# Patient Record
Sex: FEMALE | Race: WHITE | NOT HISPANIC OR LATINO | Employment: UNEMPLOYED
[De-identification: names, ages, dates, MRNs, and addresses within clinical notes are randomized per-mention and may not be internally consistent; named-entity substitution may affect disease eponyms.]

---

## 2017-07-08 ENCOUNTER — HEALTH MAINTENANCE LETTER (OUTPATIENT)
Age: 62
End: 2017-07-08

## 2017-08-22 ENCOUNTER — OFFICE VISIT (OUTPATIENT)
Dept: OTOLARYNGOLOGY | Facility: CLINIC | Age: 62
End: 2017-08-22

## 2017-08-22 VITALS
DIASTOLIC BLOOD PRESSURE: 64 MMHG | SYSTOLIC BLOOD PRESSURE: 119 MMHG | BODY MASS INDEX: 21.83 KG/M2 | HEART RATE: 69 BPM | TEMPERATURE: 97.5 F | WEIGHT: 131 LBS | HEIGHT: 65 IN

## 2017-08-22 DIAGNOSIS — H61.21 IMPACTED CERUMEN OF RIGHT EAR: Primary | ICD-10-CM

## 2017-08-22 PROCEDURE — 69210 REMOVE IMPACTED EAR WAX UNI: CPT | Mod: RT | Performed by: OTOLARYNGOLOGY

## 2017-08-22 PROCEDURE — 99202 OFFICE O/P NEW SF 15 MIN: CPT | Mod: 25 | Performed by: OTOLARYNGOLOGY

## 2017-08-22 ASSESSMENT — PAIN SCALES - GENERAL: PAINLEVEL: NO PAIN (0)

## 2017-08-22 NOTE — PROGRESS NOTES
"    History of Present Illness - Mana Govea is a 62 year old female who presents with concerns regarding cerumen impaction. This was noted by her audiologist at [facility]. She tried debrox for 4 days, but this did not seem to help. She denies any trouble with hearing loss. She denies prior ear surgery.  She also notes some occasional sharp pain in her right jaw, as well as under the right jaw. She has some history of TMJ dysfunction.    Past Medical History -   Patient Active Problem List   Diagnosis     Hyperlipidemia LDL goal <130     History of pneumonia       Current Medications -   Current Outpatient Prescriptions:      NO ACTIVE MEDICATIONS, ., Disp: , Rfl:     Allergies - No Known Allergies    Social History -   Social History     Social History     Marital status:      Spouse name: N/A     Number of children: N/A     Years of education: N/A     Social History Main Topics     Smoking status: Never Smoker     Smokeless tobacco: Never Used     Alcohol use No     Drug use: No     Sexual activity: Not on file     Other Topics Concern     Parent/Sibling W/ Cabg, Mi Or Angioplasty Before 65f 55m? No     Social History Narrative     No narrative on file       Family History -   Family History   Problem Relation Age of Onset     Hypertension Mother      HEART DISEASE Father      valve;heart     Lipids Brother      Elevated chol.     CANCER Brother      lung       Review of Systems - As per HPI and PMHx, otherwise 7 system review of the head and neck negative. 10+ system review negative.    Physical Exam  /64 (BP Location: Right arm, Patient Position: Chair, Cuff Size: Adult Regular)  Pulse 69  Temp 97.5  F (36.4  C) (Oral)  Ht 1.651 m (5' 5\")  Wt 59.4 kg (131 lb)  BMI 21.8 kg/m2  General - The patient is well nourished and well developed, and appears to have good nutritional status.  Alert and oriented to person and place, answers questions and cooperates with examination appropriately.   Head " and Face - Normocephalic and atraumatic, with no gross asymmetry noted of the contour of the facial features.  The facial nerve is intact, with strong symmetric movements.  Voice and Breathing - The patient was breathing comfortably without the use of accessory muscles. There was no wheezing, stridor, or stertor.  The patients voice was clear and strong, and had appropriate pitch and quality.  Ears - Bilateral pinna and EACs with normal appearing overlying skin. Tympanic membrane intact with good mobility on pneumatic otoscopy bilaterally. Bony landmarks of the ossicular chain are normal. The tympanic membranes are normal in appearance. No retraction, perforation, or masses.  No fluid or purulence was seen in the external canal or the middle ear.   Eyes - Extraocular movements intact.  Sclera were not icteric or injected, conjunctiva were pink and moist.  Mouth - Examination of the oral cavity showed pink, healthy oral mucosa. No lesions or ulcerations noted.  The tongue was mobile and midline, and the dentition were in good condition.    Throat - The walls of the oropharynx were smooth, pink, moist, symmetric, and had no lesions or ulcerations.  The tonsillar pillars and soft palate were symmetric.  The uvula was midline on elevation.  Neck - Normal midline excursion of the laryngotracheal complex during swallowing.  Full range of motion on passive movement.  Palpation of the occipital, submental, submandibular, internal jugular chain, and supraclavicular nodes did not demonstrate any abnormal lymph nodes or masses.  The carotid pulse was palpable bilaterally.  Palpation of the thyroid was soft and smooth, with no nodules or goiter appreciated.  The trachea was mobile and midline.  Nose - External contour is symmetric, no gross deflection or scars.  Nasal mucosa is pink and moist with no abnormal mucus.  The septum was midline and non-obstructive, turbinates of normal size and position.  No polyps, masses, or  purulence noted on examination.    Procedure: Cerumen Disimpaction  Diagnosis: Cerumen Impaction    Procedure and Findings  Ears - On examination of the ears, I found that the  ears were impacted with dense cerumen obscuring visualization of the right TM.  Therefore, I positioned the patient and I used the binocular surgical microscope to assist in visualization of the affected ear(s). The entire plug was pulled from the canal with microforceps.  The tympanic membrane was intact without sign of perforation or middle ear effusion and minimal ear canal trauma.             Assessment - Mana Govea is a 62 year old female with right cerumen impaction. Once this is removed, her ear exam appears unremarkable. I advised she f/u with her audiologist for her audiogram at this point.       Dr. Keri Cristobal MD  Otolaryngology  [facility]

## 2017-08-22 NOTE — NURSING NOTE
"Initial /64 (BP Location: Right arm, Patient Position: Chair, Cuff Size: Adult Regular)  Pulse 69  Temp 97.5  F (36.4  C) (Oral)  Ht 1.651 m (5' 5\")  Wt 59.4 kg (131 lb)  BMI 21.8 kg/m2 Estimated body mass index is 21.8 kg/(m^2) as calculated from the following:    Height as of this encounter: 1.651 m (5' 5\").    Weight as of this encounter: 59.4 kg (131 lb). .    Imani Orosco CMA    "

## 2017-08-22 NOTE — MR AVS SNAPSHOT
"              After Visit Summary   2017    Mana Govea    MRN: [id number]           Patient Information     Date Of Birth          1955        Visit Information        Provider Department      2017 9:45 AM Keri Cristobal MD [facility]        Today's Diagnoses     Impacted cerumen of right ear    -  1      Care Instructions    Per Physician's instructions            Follow-ups after your visit        Who to contact     If you have questions or need follow up information about today's clinic visit or your schedule please contact [facility] directly at [phone].  Normal or non-critical lab and imaging results will be communicated to you by [employer], letter or phone within 4 business days after the clinic has received the results. If you do not hear from us within 7 days, please contact the clinic through [employer] or phone. If you have a critical or abnormal lab result, we will notify you by phone as soon as possible.  Submit refill requests through [employer] or call your pharmacy and they will forward the refill request to us. Please allow 3 business days for your refill to be completed.          Additional Information About Your Visit        MyChart Information     [employer] lets you send messages to your doctor, view your test results, renew your prescriptions, schedule appointments and more. To sign up, go to [email or web address] . Click on \"Log in\" on the left side of the screen, which will take you to the Welcome page. Then click on \"Sign up Now\" on the right side of the page.     You will be asked to enter the access code listed below, as well as some personal information. Please follow the directions to create your username and password.     Your access code is: [email or web address]  Expires: 2017 11:07 AM     Your access code will  in 90 days. If you need help or a new code, please call your [facility] or [phone].        Care EveryWhere ID     " "This is your Care EveryWhere ID. This could be used by other organizations to access your [place] medical records  [id number]        Your Vitals Were     Pulse Temperature Height BMI (Body Mass Index)          69 97.5  F (36.4  C) (Oral) 1.651 m (5' 5\") 21.8 kg/m2         Blood Pressure from Last 3 Encounters:   08/22/17 119/64   10/07/13 122/74   10/06/13 134/69    Weight from Last 3 Encounters:   08/22/17 59.4 kg (131 lb)   10/07/13 61.2 kg (135 lb)   10/05/13 61.2 kg (135 lb)              We Performed the Following     Remove Cerumen          Today's Medication Changes          These changes are accurate as of: 8/22/17 11:07 AM.  If you have any questions, ask your nurse or doctor.               Stop taking these medicines if you haven't already. Please contact your care team if you have questions.     HYDROcodone-acetaminophen 5-325 MG per tablet   Commonly known as:  NORCO   Stopped by:  Keri Cristobal MD                    Primary Care Provider Office Phone # Fax #    Eugene Mark Pappas [phone] [address] [address]        Equal Access to Services     APRIL NOVAK AH: Hadii judy moreirao Soiramali, waaxda luqadaha, qaybta kaalmada adeegyada, demetris young . So [facility] [phone].    ATENCIÓN: Si habla español, tiene a méndez disposición servicios gratuitos de asistencia lingüística. Llame al [phone].    We comply with applicable federal civil rights laws and [place] laws. We do not discriminate on the basis of race, color, national origin, age, disability sex, sexual orientation or gender identity.            Thank you!     Thank you for choosing [facility]  for your care. Our goal is always to provide you with excellent care. Hearing back from our patients is one way we can continue to improve our services. Please take a few minutes to complete the written survey that you may receive in the mail after your visit with us. " Thank you!             Your Updated Medication List - Protect others around you: Learn how to safely use, store and throw away your medicines at www.disposemymeds.org.          This list is accurate as of: 8/22/17 11:07 AM.  Always use your most recent med list.                   Brand Name Dispense Instructions for use Diagnosis    NO ACTIVE MEDICATIONS      .

## 2021-07-05 ENCOUNTER — OFFICE VISIT (OUTPATIENT)
Dept: FAMILY MEDICINE | Facility: CLINIC | Age: 66
End: 2021-07-05
Payer: COMMERCIAL

## 2021-07-05 VITALS
HEART RATE: 66 BPM | WEIGHT: 136 LBS | SYSTOLIC BLOOD PRESSURE: 116 MMHG | HEIGHT: 64 IN | DIASTOLIC BLOOD PRESSURE: 78 MMHG | TEMPERATURE: 98.5 F | OXYGEN SATURATION: 96 % | BODY MASS INDEX: 23.22 KG/M2

## 2021-07-05 DIAGNOSIS — Z12.11 SCREEN FOR COLON CANCER: ICD-10-CM

## 2021-07-05 DIAGNOSIS — Z13.6 CARDIOVASCULAR SCREENING; LDL GOAL LESS THAN 100: ICD-10-CM

## 2021-07-05 DIAGNOSIS — Z12.31 VISIT FOR SCREENING MAMMOGRAM: ICD-10-CM

## 2021-07-05 DIAGNOSIS — Z78.0 MENOPAUSE PRESENT: ICD-10-CM

## 2021-07-05 DIAGNOSIS — Z13.220 SCREENING FOR HYPERLIPIDEMIA: ICD-10-CM

## 2021-07-05 DIAGNOSIS — Z00.00 ENCOUNTER FOR MEDICARE ANNUAL WELLNESS EXAM: Primary | ICD-10-CM

## 2021-07-05 PROCEDURE — 99387 INIT PM E/M NEW PAT 65+ YRS: CPT | Performed by: NURSE PRACTITIONER

## 2021-07-05 ASSESSMENT — MIFFLIN-ST. JEOR: SCORE: 1132.92

## 2021-07-05 NOTE — NURSING NOTE
"Chief Complaint   Patient presents with     Medicare Visit       Initial /78   Pulse 66   Temp 98.5  F (36.9  C) (Tympanic)   Ht 1.619 m (5' 3.75\")   Wt 61.7 kg (136 lb)   SpO2 96%   BMI 23.53 kg/m   Estimated body mass index is 23.53 kg/m  as calculated from the following:    Height as of this encounter: 1.619 m (5' 3.75\").    Weight as of this encounter: 61.7 kg (136 lb).    Patient presents to the clinic using No DME    Health Maintenance that is potentially due pending provider review:  Mammogram and Colonoscopy/FIT    Pt declines to have mammogram. Pt notified of due colonoscopy.           "

## 2021-07-05 NOTE — PROGRESS NOTES
"  SUBJECTIVE:   Mana Govea is a 66 year old female who presents for Preventive Visit.      Patient has been advised of split billing requirements and indicates understanding: Yes  Are you in the first 12 months of your Medicare Part B coverage?  No    Physical Health:    In general, how would you rate your overall physical health? excellent    Outside of work, how many days during the week do you exercise? none    Outside of work, approximately how many minutes a day do you exercise?not applicable    If you drink alcohol do you typically have >3 drinks per day or >7 drinks per week? No    Do you usually eat at least 4 servings of fruit and vegetables a day, include whole grains & fiber and avoid regularly eating high fat or \"junk\" foods? NO    Do you have any problems taking medications regularly?  No    Do you have any side effects from medications? not applicable    Needs assistance for the following daily activities: no assistance needed    Which of the following safety concerns are present in your home?  lack of grab bars in the bathroom     Hearing impairment: No    In the past 6 months, have you been bothered by leaking of urine? no    Mental Health:    In general, how would you rate your overall mental or emotional health? excellent  PHQ-2 Score:      Do you feel safe in your environment? Yes    Have you ever done Advance Care Planning? (For example, a Health Directive, POLST, or a discussion with a medical provider or your loved ones about your wishes): No, advance care planning information given to patient to review.  Patient plans to discuss their wishes with loved ones or provider.      Additional concerns to address?  No    Fall risk:  Fallen 2 or more times in the past year?: No  Any fall with injury in the past year?: No    Cognitive Screenin) Repeat 3 items (Leader, Season, Table)    2) Clock draw: NORMAL  3) 3 item recall: Recalls 3 objects  Results: 3 items recalled: COGNITIVE " IMPAIRMENT LESS LIKELY    Mini-CogTM Copyright PRUDENCIO Strickland. Licensed by the author for use in [facility]; reprinted with permission (timothy@.[facility]). All rights reserved.      Do you have sleep apnea, excessive snoring or daytime drowsiness?: no    -------------------------------------    Reviewed and updated as needed this visit by clinical staff  Tobacco  Allergies  Meds   Med Hx  Surg Hx  Fam Hx  Soc Hx        Reviewed and updated as needed this visit by Provider                Social History     Tobacco Use     Smoking status: Never Smoker     Smokeless tobacco: Never Used   Substance Use Topics     Alcohol use: No                           Current providers sharing in care for this patient include:   Patient Care Team:  Samantha Clement APRN CNP as PCP - General (Family Medicine)    The following health maintenance items are reviewed in Epic and correct as of today:  Health Maintenance   Topic Date Due     ANNUAL REVIEW OF HM ORDERS  Never done     ADVANCE CARE PLANNING  Never done     COLORECTAL CANCER SCREENING  Never done     COVID-19 Vaccine (1) Never done     HEPATITIS C SCREENING  Never done     DTAP/TDAP/TD IMMUNIZATION (1 - Tdap) 06/25/1979     ZOSTER IMMUNIZATION (1 of 2) Never done     MAMMO SCREENING  08/11/2006     LIPID  08/29/2010     DEXA  06/04/2019     FALL RISK ASSESSMENT  Never done     Pneumococcal Vaccine: 65+ Years (1 of 1 - PPSV23) Never done     INFLUENZA VACCINE (1) 09/01/2021     MEDICARE ANNUAL WELLNESS VISIT  07/05/2022     PHQ-2  Completed     IPV IMMUNIZATION  Aged Out     MENINGITIS IMMUNIZATION  Aged Out     HEPATITIS B IMMUNIZATION  Aged Out     BP Readings from Last 3 Encounters:   07/05/21 116/78   08/22/17 119/64   10/07/13 122/74    Wt Readings from Last 3 Encounters:   07/05/21 61.7 kg (136 lb)   08/22/17 59.4 kg (131 lb)   10/07/13 61.2 kg (135 lb)                  Pneumonia Vaccine:Adults age 65+ who received Pneumovax (PPSV23) at 65 years or older: Should  "be given PCV13 > 1 year after their most recent PPSV23  Mammogram Screening: Mammogram Screening: Recommended mammography every 1-2 years with patient discussion and risk factor consideration    ROS:  Constitutional, HEENT, cardiovascular, pulmonary, GI, , musculoskeletal, neuro, skin, endocrine and psych systems are negative, except as otherwise noted.    OBJECTIVE:   /78   Pulse 66   Temp 98.5  F (36.9  C) (Tympanic)   Ht 1.619 m (5' 3.75\")   Wt 61.7 kg (136 lb)   SpO2 96%   BMI 23.53 kg/m   Estimated body mass index is 23.53 kg/m  as calculated from the following:    Height as of this encounter: 1.619 m (5' 3.75\").    Weight as of this encounter: 61.7 kg (136 lb).  EXAM:   GENERAL APPEARANCE: healthy, alert and no distress  EYES: Eyes grossly normal to inspection, PERRL and conjunctivae and sclerae normal  HENT: ear canals and TM's normal, nose and mouth without ulcers or lesions, oropharynx clear and oral mucous membranes moist  NECK: no adenopathy, no asymmetry, masses, or scars and thyroid normal to palpation  RESP: lungs clear to auscultation - no rales, rhonchi or wheezes  BREAST: normal without masses, tenderness or nipple discharge and no palpable axillary masses or adenopathy  CV: regular rate and rhythm, normal S1 S2, no S3 or S4, no murmur, click or rub, no peripheral edema and peripheral pulses strong  ABDOMEN: soft, nontender, no hepatosplenomegaly, no masses and bowel sounds normal  MS: no musculoskeletal defects are noted and gait is age appropriate without ataxia  SKIN: no suspicious lesions or rashes  NEURO: Normal strength and tone, sensory exam grossly normal, mentation intact and speech normal  PSYCH: mentation appears normal and affect normal/bright    Diagnostic Test Results:  Labs reviewed in Epic    ASSESSMENT / PLAN:   1. Encounter for Medicare annual wellness exam      2. Screen for colon cancer    - GASTROENTEROLOGY ADULT REF PROCEDURE ONLY; Future    3. Visit for screening " "mammogram  Patient given number to schedule    4. Screening for hyperlipidemia    - Lipid panel reflex to direct LDL Fasting; Future    5. CARDIOVASCULAR SCREENING; LDL GOAL LESS THAN 100    - **Basic metabolic panel FUTURE anytime; Future    6. Menopause present    - DEXA HIP/PELVIS/SPINE - Future; Future    Patient has been advised of split billing requirements and indicates understanding: Yes    COUNSELING:  Reviewed preventive health counseling, as reflected in patient instructions       Regular exercise       Healthy diet/nutrition       Immunizations    Declined: Pneumococcal, Td, TDAP and Zoster due to Concerns about side effects/safety               Osteoporosis prevention/bone health       Colon cancer screening    Estimated body mass index is 23.53 kg/m  as calculated from the following:    Height as of this encounter: 1.619 m (5' 3.75\").    Weight as of this encounter: 61.7 kg (136 lb).        She reports that she has never smoked. She has never used smokeless tobacco.    Appropriate preventive services were discussed with this patient, including applicable screening as appropriate for cardiovascular disease, diabetes, osteopenia/osteoporosis, and glaucoma.  As appropriate for age/gender, discussed screening for colorectal cancer, prostate cancer, breast cancer, and cervical cancer. Checklist reviewing preventive services available has been given to the patient.    Reviewed patients plan of care and provided an AVS. The Basic Care Plan (routine screening as documented in Health Maintenance) for Mana meets the Care Plan requirement. This Care Plan has been established and reviewed with the Patient.    Counseling Resources:  ATP IV Guidelines  Pooled Cohorts Equation Calculator  Breast Cancer Risk Calculator  BRCA-Related Cancer Risk Assessment: FHS-7 Tool  FRAX Risk Assessment  ICSI Preventive Guidelines  Dietary Guidelines for Americans, 2010  USDA's MyPlate  ASA Prophylaxis  Lung CA " Screening    BARNEY Collier [facility]

## 2021-07-15 ENCOUNTER — HOSPITAL ENCOUNTER (OUTPATIENT)
Dept: BONE DENSITY | Facility: CLINIC | Age: 66
Discharge: HOME | End: 2021-07-15
Attending: NURSE PRACTITIONER | Admitting: NURSE PRACTITIONER
Payer: COMMERCIAL

## 2021-07-15 ENCOUNTER — LAB (OUTPATIENT)
Dept: LAB | Facility: CLINIC | Age: 66
End: 2021-07-15
Payer: COMMERCIAL

## 2021-07-15 DIAGNOSIS — Z13.220 SCREENING FOR HYPERLIPIDEMIA: ICD-10-CM

## 2021-07-15 DIAGNOSIS — Z78.0 MENOPAUSE PRESENT: ICD-10-CM

## 2021-07-15 DIAGNOSIS — Z13.6 CARDIOVASCULAR SCREENING; LDL GOAL LESS THAN 100: ICD-10-CM

## 2021-07-15 LAB
ANION GAP SERPL CALCULATED.3IONS-SCNC: 4 MMOL/L (ref 3–14)
BUN SERPL-MCNC: 13 MG/DL (ref 7–30)
CALCIUM SERPL-MCNC: 9.5 MG/DL (ref 8.5–10.1)
CHLORIDE BLD-SCNC: 106 MMOL/L (ref 94–109)
CHOLEST SERPL-MCNC: 313 MG/DL
CO2 SERPL-SCNC: 29 MMOL/L (ref 20–32)
CREAT SERPL-MCNC: 0.74 MG/DL (ref 0.52–1.04)
FASTING STATUS PATIENT QL REPORTED: YES
GFR SERPL CREATININE-BSD FRML MDRD: 84 ML/MIN/1.73M2
GLUCOSE BLD-MCNC: 90 MG/DL (ref 70–99)
HDLC SERPL-MCNC: 65 MG/DL
LDLC SERPL CALC-MCNC: 222 MG/DL
NONHDLC SERPL-MCNC: 248 MG/DL
POTASSIUM BLD-SCNC: 4.3 MMOL/L (ref 3.4–5.3)
SODIUM SERPL-SCNC: 139 MMOL/L (ref 133–144)
TRIGL SERPL-MCNC: 130 MG/DL

## 2021-07-15 PROCEDURE — 80061 LIPID PANEL: CPT

## 2021-07-15 PROCEDURE — 80048 BASIC METABOLIC PNL TOTAL CA: CPT

## 2021-07-15 PROCEDURE — 77080 DXA BONE DENSITY AXIAL: CPT

## 2021-07-15 PROCEDURE — 36415 COLL VENOUS BLD VENIPUNCTURE: CPT

## 2021-07-19 DIAGNOSIS — M81.0 OSTEOPOROSIS WITHOUT CURRENT PATHOLOGICAL FRACTURE, UNSPECIFIED OSTEOPOROSIS TYPE: Primary | ICD-10-CM

## 2021-07-19 RX ORDER — ALENDRONATE SODIUM 70 MG/1
70 TABLET ORAL
Qty: 12 TABLET | Refills: 3 | Status: SHIPPED | OUTPATIENT
Start: 2021-07-19 | End: 2022-04-21

## 2021-07-22 ENCOUNTER — VIRTUAL VISIT (OUTPATIENT)
Dept: FAMILY MEDICINE | Facility: CLINIC | Age: 66
End: 2021-07-22
Payer: COMMERCIAL

## 2021-07-22 VITALS — WEIGHT: 135 LBS | BODY MASS INDEX: 23.35 KG/M2

## 2021-07-22 DIAGNOSIS — M81.0 OSTEOPOROSIS WITHOUT CURRENT PATHOLOGICAL FRACTURE, UNSPECIFIED OSTEOPOROSIS TYPE: ICD-10-CM

## 2021-07-22 DIAGNOSIS — E78.5 HYPERLIPIDEMIA LDL GOAL <100: Primary | ICD-10-CM

## 2021-07-22 PROCEDURE — 99214 OFFICE O/P EST MOD 30 MIN: CPT | Mod: 95 | Performed by: NURSE PRACTITIONER

## 2021-07-22 RX ORDER — ATORVASTATIN CALCIUM 20 MG/1
20 TABLET, FILM COATED ORAL DAILY
Qty: 30 TABLET | Refills: 3 | Status: SHIPPED | OUTPATIENT
Start: 2021-07-22 | End: 2021-12-17

## 2021-07-22 RX ORDER — ALENDRONATE SODIUM 70 MG/1
70 TABLET ORAL
Qty: 12 TABLET | Refills: 3 | Status: SHIPPED | OUTPATIENT
Start: 2021-07-22 | End: 2022-04-21

## 2021-07-22 NOTE — PROGRESS NOTES
Mana is a 66 year old who is being evaluated via a billable telephone visit.      What phone number would you like to be contacted at? [phone]  How would you like to obtain your AVS? MyChart    Assessment & Plan     Hyperlipidemia LDL goal <100  New-   Dicussed diet and exercise  - start atorvastatin (LIPITOR) 20 MG tablet; Take 1 tablet (20 mg) by mouth daily  - recheck in 3 months Lipid panel reflex to direct LDL Fasting; Future  - ALT; Future    Osteoporosis without current pathological fracture, unspecified osteoporosis type  New- repeat DEXA in 2 yrs.   Discussed Weight bearing exercises/ prevent falls   - start alendronate (FOSAMAX) 70 MG tablet; Take 1 tablet (70 mg) by mouth every 7 days        No follow-ups on file.    BARNEY Collier [facility]    Subjective   Mana is a 66 year old who presents for the following health issues    HPI     Hyperlipidemia Follow-Up      Are you regularly taking any medication or supplement to lower your cholesterol?   No    Are you having muscle aches or other side effects that you think could be caused by your cholesterol lowering medication?  No      Osteoporosis: new- noted on DEXA scan. No history of fractures.         Review of Systems   Constitutional, HEENT, cardiovascular, pulmonary, GI, , musculoskeletal, neuro, skin, endocrine and psych systems are negative, except as otherwise noted.      Objective           Vitals:  No vitals were obtained today due to virtual visit.    Physical Exam   healthy, alert and no distress  PSYCH: Alert and oriented times 3; coherent speech, normal   rate and volume, able to articulate logical thoughts, able   to abstract reason, no tangential thoughts, no hallucinations   or delusions  Her affect is normal  RESP: No cough, no audible wheezing, able to talk in full sentences  Remainder of exam unable to be completed due to telephone visits            Phone call duration: 13 minutes

## 2021-08-16 ENCOUNTER — HOSPITAL ENCOUNTER (EMERGENCY)
Facility: CLINIC | Age: 66
Discharge: HOME | End: 2021-08-16
Attending: NURSE PRACTITIONER | Admitting: NURSE PRACTITIONER
Payer: COMMERCIAL

## 2021-08-16 VITALS — SYSTOLIC BLOOD PRESSURE: 122 MMHG | DIASTOLIC BLOOD PRESSURE: 74 MMHG | RESPIRATION RATE: 16 BRPM

## 2021-08-16 DIAGNOSIS — S61.209A FINGERTIP AVULSION, INITIAL ENCOUNTER: ICD-10-CM

## 2021-08-16 PROCEDURE — G0463 HOSPITAL OUTPT CLINIC VISIT: HCPCS | Performed by: NURSE PRACTITIONER

## 2021-08-16 PROCEDURE — 99212 OFFICE O/P EST SF 10 MIN: CPT | Performed by: NURSE PRACTITIONER

## 2021-08-16 NOTE — DISCHARGE INSTRUCTIONS
Leave the dressing in place until tomorrow.  Then change dressing twice a day. Leave the gauze that is placed on the tip of the finger fall off on its own.  Recheck for increased bleeding, redness, or drainage.

## 2021-08-16 NOTE — ED TRIAGE NOTES
Right pinky laceration.  Patient was slicing cucumbers with a knife and sliced finger.  Last Tdap was 2003.  Patient does not want booster.

## 2021-08-17 NOTE — ED PROVIDER NOTES
History     Chief Complaint   Patient presents with     Laceration     HPI  Mana Govea is a 66 year old female who presents to urgent care for evaluation of laceration to her right little finger.  Patient was using a vegetable slicer to cut cucumbers and accidentally sliced the tip of her finger.  Tetanus is not up-to-date, and patient is refusing update of her tetanus shot today.    Allergies:  No Known Allergies    Problem List:    Patient Active Problem List    Diagnosis Date Noted     Hyperlipidemia LDL goal <130 09/13/2012     Priority: Medium     History of pneumonia 09/13/2012     Priority: Medium        Past Medical History:    Past Medical History:   Diagnosis Date     Other and unspecified hyperlipidemia        Past Surgical History:    No past surgical history on file.    Family History:    Family History   Problem Relation Age of Onset     Hypertension Mother      Heart Disease Father         valve;heart     Lipids Brother         Elevated chol.     Cancer Brother         lung       Social History:  Marital Status:   [2]  Social History     Tobacco Use     Smoking status: Never Smoker     Smokeless tobacco: Never Used   Substance Use Topics     Alcohol use: No     Drug use: No        Medications:    alendronate (FOSAMAX) 70 MG tablet  alendronate (FOSAMAX) 70 MG tablet  atorvastatin (LIPITOR) 20 MG tablet  NO ACTIVE MEDICATIONS          Review of Systems  As mentioned above in the history present illness. All other systems were reviewed and are negative.    Physical Exam   BP: 122/74  Pulse: (P) 73  Resp: 16  SpO2: (P) 99 %      Physical Exam  Appearance: Alert, oriented, no acute distress.  Right fifth finger distal tip 3.5 cm avulsion.  No disruption to the nail.  Scant amount of blood oozing from the wound.         ED Course        Procedures            Wound dressed with Surgicel and tube gauze.    Assessments & Plan (with Medical Decision Making)   Leave the dressing in place until  tomorrow.  Then change dressing twice a day. Leave the gauze that is placed on the tip of the finger fall off on its own.  Recheck for increased bleeding, redness, or drainage.    I have reviewed the nursing notes.    I have reviewed the findings, diagnosis, plan and need for follow up with the patient.      Discharge Medication List as of 8/16/2021  4:32 PM          Final diagnoses:   Fingertip avulsion, initial encounter       8/16/2021   [facility] EMERGENCY DEPT     Keerthi Luo APRN CNP  08/16/21 2043

## 2021-10-13 ENCOUNTER — LAB (OUTPATIENT)
Dept: LAB | Facility: CLINIC | Age: 66
End: 2021-10-13
Payer: COMMERCIAL

## 2021-10-13 DIAGNOSIS — E78.5 HYPERLIPIDEMIA LDL GOAL <100: ICD-10-CM

## 2021-10-13 LAB
ALT SERPL W P-5'-P-CCNC: 24 U/L (ref 0–50)
CHOLEST SERPL-MCNC: 202 MG/DL
FASTING STATUS PATIENT QL REPORTED: YES
HDLC SERPL-MCNC: 67 MG/DL
LDLC SERPL CALC-MCNC: 116 MG/DL
NONHDLC SERPL-MCNC: 135 MG/DL
TRIGL SERPL-MCNC: 96 MG/DL

## 2021-10-13 PROCEDURE — 84460 ALANINE AMINO (ALT) (SGPT): CPT

## 2021-10-13 PROCEDURE — 80061 LIPID PANEL: CPT

## 2021-10-13 PROCEDURE — 36415 COLL VENOUS BLD VENIPUNCTURE: CPT

## 2021-12-17 DIAGNOSIS — E78.5 HYPERLIPIDEMIA LDL GOAL <100: ICD-10-CM

## 2021-12-17 RX ORDER — ATORVASTATIN CALCIUM 20 MG/1
TABLET, FILM COATED ORAL
Qty: 90 TABLET | Refills: 0 | Status: SHIPPED | OUTPATIENT
Start: 2021-12-17 | End: 2022-04-21

## 2022-04-20 ENCOUNTER — TELEPHONE (OUTPATIENT)
Dept: FAMILY MEDICINE | Facility: CLINIC | Age: 67
End: 2022-04-20
Payer: COMMERCIAL

## 2022-04-21 ENCOUNTER — VIRTUAL VISIT (OUTPATIENT)
Dept: FAMILY MEDICINE | Facility: CLINIC | Age: 67
End: 2022-04-21
Payer: COMMERCIAL

## 2022-04-21 DIAGNOSIS — Z12.11 SCREEN FOR COLON CANCER: ICD-10-CM

## 2022-04-21 DIAGNOSIS — E78.5 HYPERLIPIDEMIA LDL GOAL <100: ICD-10-CM

## 2022-04-21 DIAGNOSIS — Z12.31 VISIT FOR SCREENING MAMMOGRAM: ICD-10-CM

## 2022-04-21 PROCEDURE — 99213 OFFICE O/P EST LOW 20 MIN: CPT | Mod: 95 | Performed by: NURSE PRACTITIONER

## 2022-04-21 RX ORDER — ATORVASTATIN CALCIUM 20 MG/1
TABLET, FILM COATED ORAL
Qty: 90 TABLET | Refills: 3 | Status: SHIPPED | OUTPATIENT
Start: 2022-04-21 | End: 2022-04-21

## 2022-04-21 NOTE — TELEPHONE ENCOUNTER
Form completed, signed, and faxed to [facility] at [phone]. Copy of form sent to scan and copy placed in basket.     Patient asks to be notified when form was faxed.  She is not active in My Chart. [phone]

## 2022-04-21 NOTE — TELEPHONE ENCOUNTER
[facility] medical history form started and routed to Dr. Mckeon for review and signature.

## 2022-04-21 NOTE — PROGRESS NOTES
Mana is a 66 year old who is being evaluated via a billable telephone visit.      What phone number would you like to be contacted at? [phone]  How would you like to obtain your AVS? Mail a copy    Assessment & Plan     Hyperlipidemia LDL goal <100  Well controlled.  - atorvastatin (LIPITOR) 20 MG tablet; TAKE 1 TABLET ONCE DAILY    Screen for colon cancer  - Adult Gastro Ref - Procedure Only; Future    Visit for screening mammogram  - MA SCREENING DIGITAL BILAT - Future  (s+30); Future      The risks, benefits and treatment options of prescribed medications or other treatments have been discussed with the patient. The patient verbalized their understanding and should call or follow up if no improvement or if they develop further problems.    [facility]            Vanda Adair is a 66 year old who presents for the following health issues     HPI     Chief Complaint   Patient presents with     Refill Request     atorvastatin     Patient Request     Wants to donate plasma  Also has questions about form she dropped off-  (is with Dr. Mckeon for review and signature.)         Refill lipitor  Hyperlipidemia Follow-Up  Last lipid profile and ALT was 10/13/21      Are you regularly taking any medication or supplement to lower your cholesterol?   Yes- atorvastatin    Are you having muscle aches or other side effects that you think could be caused by your cholesterol lowering medication?  No     Recent Labs   Lab Test 10/13/21  0815 07/15/21  0901   CHOL 202* 313*   HDL 67 65   * 222*   TRIG 96 130           How many servings of fruits and vegetables do you eat daily?  2-3    On average, how many sweetened beverages do you drink each day (Examples: soda, juice, sweet tea, etc.  Do NOT count diet or artificially sweetened beverages)?   0    How many days per week do you exercise enough to make your heart beat faster? 3 or less    How many minutes a day  "do you exercise enough to make your heart beat faster? 20 - 29    How many days per week do you miss taking your medication? 0        Review of Systems   Constitutional, HEENT, cardiovascular, pulmonary, gi and gu systems are negative, except as otherwise noted.      Objective    Vitals - Patient Reported  Weight (Patient Reported): 59 kg (130 lb)  Height (Patient Reported): 162.6 cm (5' 4\")  BMI (Based on Pt Reported Ht/Wt): 22.31  Pain Score: No Pain (0)      Vitals:  No vitals were obtained today due to virtual visit.    Physical Exam     PSYCH: Alert and oriented times 3; coherent speech, normal   rate and volume, able to articulate logical thoughts, able   to abstract reason, no tangential thoughts, no hallucinations   or delusions    RESP: No cough, no audible wheezing, able to talk in full sentences  Remainder of exam unable to be completed due to telephone visits                Phone call duration: 10 minutes  "

## 2022-09-14 ENCOUNTER — TELEPHONE (OUTPATIENT)
Dept: FAMILY MEDICINE | Facility: CLINIC | Age: 67
End: 2022-09-14

## 2022-09-14 NOTE — LETTER
September 14, 2022      Mana Govea  [address] [facility] [id number]      Your healthcare team cares about your health. To provide you with the best care,   we have reviewed your chart and based on our findings, we see that you are due to:     - BREAST CANCER SCREENING:  Schedule Annual Mammogram. [facility] scheduling number - [phone] or schedule in MyChart (self referall)  - COLON CANCER SCREENING:  Call or mychart the clinic to schedule your colonoscopy or schedule/ your FIT Test, or Cologuard test    If you have already completed these items, please contact the clinic via phone or   [email or web address] so your care team can review and update your records. Thank you for   choosing [facility] for your healthcare needs. For any questions,   concerns, or to schedule an appointment please contact the clinic.       Healthy Regards,      Your [facility] Care Team

## 2022-09-14 NOTE — TELEPHONE ENCOUNTER
Patient Quality Outreach    Patient is due for the following:   Colon Cancer Screening  Breast Cancer Screening - Mammogram    Next Steps:   Schedule a Annual Wellness Visit  Schedule mammogram  Type of outreach:    Sent letter.      Questions for provider review:    None     Renee Barriga, [facility]

## 2022-11-28 ENCOUNTER — OFFICE VISIT (OUTPATIENT)
Dept: FAMILY MEDICINE | Facility: CLINIC | Age: 67
End: 2022-11-28
Payer: COMMERCIAL

## 2022-11-28 VITALS
WEIGHT: 131.9 LBS | HEIGHT: 64 IN | TEMPERATURE: 98.3 F | OXYGEN SATURATION: 97 % | SYSTOLIC BLOOD PRESSURE: 114 MMHG | HEART RATE: 68 BPM | DIASTOLIC BLOOD PRESSURE: 68 MMHG | RESPIRATION RATE: 16 BRPM | BODY MASS INDEX: 22.52 KG/M2

## 2022-11-28 DIAGNOSIS — M81.0 AGE-RELATED OSTEOPOROSIS WITHOUT CURRENT PATHOLOGICAL FRACTURE: ICD-10-CM

## 2022-11-28 DIAGNOSIS — Z00.00 ENCOUNTER FOR MEDICARE ANNUAL WELLNESS EXAM: Primary | ICD-10-CM

## 2022-11-28 DIAGNOSIS — N64.4 BREAST TENDERNESS IN FEMALE: ICD-10-CM

## 2022-11-28 DIAGNOSIS — Z11.59 NEED FOR HEPATITIS C SCREENING TEST: ICD-10-CM

## 2022-11-28 LAB
ANION GAP SERPL CALCULATED.3IONS-SCNC: 6 MMOL/L (ref 7–15)
BUN SERPL-MCNC: 11.8 MG/DL (ref 8–23)
CALCIUM SERPL-MCNC: 10.1 MG/DL (ref 8.8–10.2)
CHLORIDE SERPL-SCNC: 103 MMOL/L (ref 98–107)
CHOLEST SERPL-MCNC: 189 MG/DL
CREAT SERPL-MCNC: 0.67 MG/DL (ref 0.51–0.95)
DEPRECATED CALCIDIOL+CALCIFEROL SERPL-MC: 45 UG/L (ref 20–75)
DEPRECATED HCO3 PLAS-SCNC: 30 MMOL/L (ref 22–29)
GFR SERPL CREATININE-BSD FRML MDRD: >90 ML/MIN/1.73M2
GLUCOSE SERPL-MCNC: 86 MG/DL (ref 70–99)
HCV AB SERPL QL IA: NONREACTIVE
HDLC SERPL-MCNC: 64 MG/DL
LDLC SERPL CALC-MCNC: 99 MG/DL
NONHDLC SERPL-MCNC: 125 MG/DL
POTASSIUM SERPL-SCNC: 4.4 MMOL/L (ref 3.4–5.3)
SODIUM SERPL-SCNC: 139 MMOL/L (ref 136–145)
TRIGL SERPL-MCNC: 128 MG/DL

## 2022-11-28 PROCEDURE — 86803 HEPATITIS C AB TEST: CPT | Performed by: STUDENT IN AN ORGANIZED HEALTH CARE EDUCATION/TRAINING PROGRAM

## 2022-11-28 PROCEDURE — 99213 OFFICE O/P EST LOW 20 MIN: CPT | Performed by: STUDENT IN AN ORGANIZED HEALTH CARE EDUCATION/TRAINING PROGRAM

## 2022-11-28 PROCEDURE — 80061 LIPID PANEL: CPT | Performed by: STUDENT IN AN ORGANIZED HEALTH CARE EDUCATION/TRAINING PROGRAM

## 2022-11-28 PROCEDURE — 82306 VITAMIN D 25 HYDROXY: CPT | Performed by: STUDENT IN AN ORGANIZED HEALTH CARE EDUCATION/TRAINING PROGRAM

## 2022-11-28 PROCEDURE — 80048 BASIC METABOLIC PNL TOTAL CA: CPT | Performed by: STUDENT IN AN ORGANIZED HEALTH CARE EDUCATION/TRAINING PROGRAM

## 2022-11-28 PROCEDURE — 36415 COLL VENOUS BLD VENIPUNCTURE: CPT | Performed by: STUDENT IN AN ORGANIZED HEALTH CARE EDUCATION/TRAINING PROGRAM

## 2022-11-28 ASSESSMENT — ENCOUNTER SYMPTOMS
SORE THROAT: 0
HEADACHES: 0
HEARTBURN: 0
CHILLS: 0
ABDOMINAL PAIN: 0
DYSURIA: 0
HEMATURIA: 0
FREQUENCY: 0
MYALGIAS: 0
EYE PAIN: 0
PARESTHESIAS: 0
SHORTNESS OF BREATH: 0
BREAST MASS: 0
ARTHRALGIAS: 0
PALPITATIONS: 0
DIARRHEA: 0
FEVER: 0
NAUSEA: 0
WEAKNESS: 0
CONSTIPATION: 0
COUGH: 0
NERVOUS/ANXIOUS: 0
HEMATOCHEZIA: 0
JOINT SWELLING: 0
DIZZINESS: 0

## 2022-11-28 ASSESSMENT — PAIN SCALES - GENERAL: PAINLEVEL: NO PAIN (0)

## 2022-11-28 ASSESSMENT — ACTIVITIES OF DAILY LIVING (ADL): CURRENT_FUNCTION: NO ASSISTANCE NEEDED

## 2022-11-28 NOTE — LETTER
November 30, 2022      Mana Govea  [address] [facility] [id number]        Dear ,    We are writing to inform you of your test results.      It was a pleasure speaking with you. I have received and reviewed your lab results, and have the following recommendations:     Congrats! All your labs are within normal limits, no further or additional workup is necessary. Please complete your breast ultrasound when possible. The results will be available to me and you via [employer] if you would like to set that up.     Resulted Orders   Vitamin D Deficiency   Result Value Ref Range    Vitamin D, Total (25-Hydroxy) 45 20 - 75 ug/L    Narrative    Season, race, dietary intake, and treatment affect the concentration of 25-hydroxy-Vitamin D. Values may decrease during winter months and increase during summer months. Values 20-29 ug/L may indicate Vitamin D insufficiency and values <20 ug/L may indicate Vitamin D deficiency.    Vitamin D determination is routinely performed by an immunoassay specific for 25 hydroxyvitamin D3.  If an individual is on vitamin D2(ergocalciferol) supplementation, please specify 25 OH vitamin D2 and D3 level determination by LCMSMS test VITD23.     Hepatitis C Screen Reflex to HCV RNA Quant and Genotype   Result Value Ref Range    Hepatitis C Antibody Nonreactive Nonreactive    Narrative    Assay performance characteristics have not been established for newborns, infants, and children.   Lipid panel reflex to direct LDL Fasting   Result Value Ref Range    Cholesterol 189 <200 mg/dL    Triglycerides 128 <150 mg/dL    Direct Measure HDL 64 >=50 mg/dL    LDL Cholesterol Calculated 99 <=100 mg/dL    Non HDL Cholesterol 125 <130 mg/dL    Narrative    Cholesterol  Desirable:  <200 mg/dL    Triglycerides  Normal:  Less than 150 mg/dL  Borderline High:  150-199 mg/dL  High:  200-499 mg/dL  Very High:  Greater than or equal to 500 mg/dL    Direct Measure HDL  Female:  Greater than or  equal to 50 mg/dL   Male:  Greater than or equal to 40 mg/dL    LDL Cholesterol  Desirable:  <100mg/dL  Above Desirable:  100-129 mg/dL   Borderline High:  130-159 mg/dL   High:  160-189 mg/dL   Very High:  >= 190 mg/dL    Non HDL Cholesterol  Desirable:  130 mg/dL  Above Desirable:  130-159 mg/dL  Borderline High:  160-189 mg/dL  High:  190-219 mg/dL  Very High:  Greater than or equal to 220 mg/dL   Basic metabolic panel  (Ca, Cl, CO2, Creat, Gluc, K, Na, BUN)   Result Value Ref Range    Sodium 139 136 - 145 mmol/L    Potassium 4.4 3.4 - 5.3 mmol/L    Chloride 103 98 - 107 mmol/L    Carbon Dioxide (CO2) 30 (H) 22 - 29 mmol/L    Anion Gap 6 (L) 7 - 15 mmol/L    Urea Nitrogen 11.8 8.0 - 23.0 mg/dL    Creatinine 0.67 0.51 - 0.95 mg/dL    Calcium 10.1 8.8 - 10.2 mg/dL    Glucose 86 70 - 99 mg/dL    GFR Estimate >90 >60 mL/min/1.73m2      Comment:      Effective December 21, 2021 eGFRcr in adults is calculated using the 2021 CKD-EPI creatinine equation which includes age and gender ( et al., NEJM, DOI: 10.1056/JPDHkp5472181)       If you have any questions or concerns, please call the clinic at the number listed above.       Sincerely,      Fang Otto MD

## 2022-11-28 NOTE — PATIENT INSTRUCTIONS
Hello! It was a pleasure seeing you today. Just some things we discussed at today's visit:     Routine Healthcare    Please go to [facility] for blood work   Results of blood work will be sent to me and you can also see the results on mychart   Weight bearing exercises are good options for osteoperosis if you are not interested in the medication treatment   Please call [phone] to schedule an appointment for your     Sincerely,     Fang Otto MD          Patient Education   Personalized Prevention Plan  You are due for the preventive services outlined below.  Your care team is available to assist you in scheduling these services.  If you have already completed any of these items, please share that information with your care team to update in your medical record.  Health Maintenance Due   Topic Date Due    COVID-19 Vaccine (1) Never done    Colorectal Cancer Screening  Never done    Hepatitis C Screening  Never done    Diptheria Tetanus Pertussis (DTAP/TDAP/TD) Vaccine (1 - Tdap) 06/12/2003    Zoster (Shingles) Vaccine (1 of 2) Never done    Mammogram  08/11/2006    Pneumococcal Vaccine (1 - PCV) Never done    Annual Wellness Visit  07/05/2022    ANNUAL REVIEW OF HM ORDERS  07/05/2022    Flu Vaccine (1) Never done       Exercise for a Healthier Heart  You may wonder how you can improve the health of your heart. If you re thinking about exercise, you re on the right track. You don t need to become an athlete. But you do need a certain amount of brisk exercise to help strengthen your heart. If you have been diagnosed with a heart condition, your healthcare provider may advise exercise to help stabilize your condition. To help make exercise a habit, choose safe, fun activities.      Exercise with a friend. When activity is fun, you're more likely to stick with it.   Before you start  Check with your healthcare provider before starting an exercise program. This is especially important if you have not been active for  a while. It's also important if you have a long-term (chronic) health problem such as heart disease, diabetes, or obesity. Or if you are at high risk for having these problems.   Why exercise?  Exercising regularly offers many healthy rewards. It can help you do all of the following:   Improve your blood cholesterol level to help prevent further heart trouble  Lower your blood pressure to help prevent a stroke or heart attack  Control diabetes, or reduce your risk of getting this disease  Improve your heart and lung function  Reach and stay at a healthy weight  Make your muscles stronger so you can stay active  Prevent falls and fractures by slowing the loss of bone mass (osteoporosis)  Manage stress better  Reduce your blood pressure  Improve your sense of self and your body image  Exercise tips    Ease into your routine. Set small goals. Then build on them. If you are not sure what your activity level should be, talk with your healthcare provider first before starting an exercise routine.  Exercise on most days. Aim for a total of 150 minutes (2 hours and 30 minutes) or more of moderate-intensity aerobic activity each week. Or 75 minutes (1 hour and 15 minutes) or more of vigorous-intensity aerobic activity each week. Or try for a combination of both. Moderate activity means that you breathe heavier and your heart rate increases but you can still talk. Think about doing 40 minutes of moderate exercise, 3 to 4 times a week. For best results, activity should last for about 40 minutes to lower blood pressure and cholesterol. It's OK to work up to the 40-minute period over time. Examples of moderate-intensity activity are walking 1 mile in 15 minutes. Or doing 30 to 45 minutes of yard work.  Step up your daily activity level.  Along with your exercise program, try being more active the whole day. Walk instead of drive. Or park further away so that you take more steps each day. Do more household tasks or yard work. You  may not be able to meet the advised mount of physical activity. But doing some moderate- or vigorous-intensity aerobic activity can help reduce your risk for heart disease. Your healthcare provider can help you figure out what is best for you.  Choose 1 or more activities you enjoy.  Walking is one of the easiest things you can do. You can also try swimming, riding a bike, dancing, or taking an exercise class.    When to call your healthcare provider  Call your healthcare provider if you have any of these:   Chest pain or feel dizzy or lightheaded  Burning, tightness, pressure, or heaviness in your chest, neck, shoulders, back, or arms  Abnormal shortness of breath  More joint or muscle pain  A very fast or irregular heartbeat (palpitations)  [employer] last reviewed this educational content on 7/1/2019 2000-2021 [employer]. All rights reserved. This information is not intended as a substitute for professional medical care. Always follow your healthcare professional's instructions.          Understanding USDA MyPlate  The USDA has guidelines to help you make healthy food choices. These are called MyPlate. MyPlate shows the food groups that make up healthy meals using the image of a place setting. Before you eat, think about the healthiest choices for what to put on your plate or in your cup or bowl. To learn more about building a healthy plate, visit www.choosemyplate.gov.    The food groups  Fruits. Any fruit or 100% fruit juice counts as part of the Fruit Group. Fruits may be fresh, canned, frozen, or dried, and may be whole, cut-up, or pureed. Make 1/2 of your plate fruits and vegetables.  Vegetables. Any vegetable or 100% vegetable juice counts as a member of the Vegetable Group. Vegetables may be fresh, frozen, canned, or dried. They can be served raw or cooked and may be whole, cut-up, or mashed. Make 1/2 of your plate fruits and vegetables.  Grains. All foods made from grains are part of the  Grains Group. These include wheat, rice, oats, cornmeal, and barley. Grains are often used to make foods such as bread, pasta, oatmeal, cereal, tortillas, and grits. Grains should be no more than 1/4 of your plate. At least half of your grains should be whole grains.  Protein. This group includes meat, poultry, seafood, beans and peas, eggs, processed soy products (such as tofu), nuts (including nut butters), and seeds. Make protein choices no more than 1/4 of your plate. Meat and poultry choices should be lean or low fat.  Dairy. The Dairy Group includes all fluid milk products and foods made from milk that contain calcium, such as yogurt and cheese. (Foods that have little calcium, such as cream, butter, and cream cheese, are not part of this group.) Most dairy choices should be low-fat or fat-free.  Oils. Oils aren't a food group, but they do contain essential nutrients. However it's important to watch your intake of oils. These are fats that are liquid at room temperature. They include canola, corn, olive, soybean, vegetable, and sunflower oil. Foods that are mainly oil include mayonnaise, certain salad dressings, and soft margarines. You likely already get your daily oil allowance from the foods you eat.  Things to limit  Eating healthy also means limiting these things in your diet:     Salt (sodium). Many processed foods have a lot of sodium. To keep sodium intake down, eat fresh vegetables, meats, poultry, and seafood when possible. Purchase low-sodium, reduced-sodium, or no-salt-added food products at the store. And don't add salt to your meals at home. Instead, season them with herbs and spices such as dill, oregano, cumin, and paprika. Or try adding flavor with lemon or lime zest and juice.  Saturated fat. Saturated fats are most often found in animal products such as beef, pork, and chicken. They are often solid at room temperature, such as butter. To reduce your saturated fat intake, choose leaner cuts  of meat and poultry. And try healthier cooking methods such as grilling, broiling, roasting, or baking. For a simple lower-fat swap, use plain nonfat yogurt instead of mayonnaise when making potato salad or macaroni salad.  Added sugars. These are sugars added to foods. They are in foods such as ice cream, candy, soda, fruit drinks, sports drinks, energy drinks, cookies, pastries, jams, and syrups. Cut down on added sugars by sharing sweet treats with a family member or friend. You can also choose fruit for dessert, and drink water or other unsweetened beverages.     [employer] last reviewed this educational content on 6/1/2020 2000-2021 [employer]. All rights reserved. This information is not intended as a substitute for professional medical care. Always follow your healthcare professional's instructions.

## 2022-11-28 NOTE — PROGRESS NOTES
"SUBJECTIVE:   Mana is a 67 year old who presents for Preventive Visit.    Patient has been advised of split billing requirements and indicates understanding: Yes  Are you in the first 12 months of your Medicare coverage?  No    Healthy Habits:     In general, how would you rate your overall health?  Excellent    Frequency of exercise:  1 day/week    Duration of exercise:  15-30 minutes    Do you usually eat at least 4 servings of fruit and vegetables a day, include whole grains    & fiber and avoid regularly eating high fat or \"junk\" foods?  No    Taking medications regularly:  Yes    Barriers to taking medications:  None    Medication side effects:  None    Ability to successfully perform activities of daily living:  No assistance needed    Home Safety:  No safety concerns identified    Hearing Impairment:  No hearing concerns    In the past 6 months, have you been bothered by leaking of urine?  No    In general, how would you rate your overall mental or emotional health?  Good      PHQ-2 Total Score: 0    Additional concerns today:  No      Have you ever done Advance Care Planning? (For example, a Health Directive, POLST, or a discussion with a medical provider or your loved ones about your wishes): No, advance care planning information given to patient to review.  Advanced care planning was discussed at today's visit.     Fall risk  Fallen 2 or more times in the past year?: No  Any fall with injury in the past year?: No    Cognitive Screening   1) Repeat 3 items (Leader, Season, Table)    2) Clock draw: NORMAL  3) 3 item recall: Recalls 3 objects  Results: NORMAL clock, 1-2 items recalled: COGNITIVE IMPAIRMENT LESS LIKELY    Mini-CogTM Copyright PRUDENCIO Strickland. Licensed by the author for use in [facility]; reprinted with permission (timothy@.[facility]). All rights reserved.      Do you have sleep apnea, excessive snoring or daytime drowsiness?: no    Reviewed and updated as needed this visit by clinical " staff   Tobacco  Allergies  Meds              Reviewed and updated as needed this visit by Provider                 Social History     Tobacco Use     Smoking status: Never     Smokeless tobacco: Never   Substance Use Topics     Alcohol use: No     If you drink alcohol do you typically have >3 drinks per day or >7 drinks per week? No    Alcohol Use 11/28/2022   Prescreen: >3 drinks/day or >7 drinks/week? No         Current providers sharing in care for this patient include:   Patient Care Team:  No Ref-Primary, Physician as PCP - Raya Cerda APRN CNP as Assigned PCP    The following health maintenance items are reviewed in Epic and correct as of today:  Health Maintenance   Topic Date Due     COVID-19 Vaccine (1) Never done     COLORECTAL CANCER SCREENING  Never done     HEPATITIS C SCREENING  Never done     DTAP/TDAP/TD IMMUNIZATION (1 - Tdap) 06/12/2003     ZOSTER IMMUNIZATION (1 of 2) Never done     MAMMO SCREENING  08/11/2006     Pneumococcal Vaccine: 65+ Years (1 - PCV) Never done     MEDICARE ANNUAL WELLNESS VISIT  07/05/2022     ANNUAL REVIEW OF HM ORDERS  07/05/2022     INFLUENZA VACCINE (1) Never done     FALL RISK ASSESSMENT  11/28/2023     ADVANCE CARE PLANNING  07/05/2026     LIPID  10/13/2026     DEXA  07/15/2036     PHQ-2 (once per calendar year)  Completed     IPV IMMUNIZATION  Aged Out     MENINGITIS IMMUNIZATION  Aged Out     Lab work is in process    FHS-7:   Breast CA Risk Assessment (FHS-7) 11/28/2022   Did any of your first-degree relatives have breast or ovarian cancer? Yes   Did any of your relatives have bilateral breast cancer? No   Did any man in your family have breast cancer? No   Did any woman in your family have breast and ovarian cancer? Yes   Did any woman in your family have breast cancer before age 50 y? No   Do you have 2 or more relatives with breast and/or ovarian cancer? No   Do you have 2 or more relatives with breast and/or bowel cancer? No     Mammogram  "Screening: Recommended mammography every 1-2 years with patient discussion and risk factor consideration  Pertinent mammograms are reviewed under the imaging tab.    Review of Systems   Constitutional: Negative for chills and fever.   HENT: Negative for congestion, ear pain, hearing loss and sore throat.    Eyes: Negative for pain and visual disturbance.   Respiratory: Negative for cough and shortness of breath.    Cardiovascular: Negative for chest pain, palpitations and peripheral edema.   Gastrointestinal: Negative for abdominal pain, constipation, diarrhea, heartburn, hematochezia and nausea.   Breasts:  Negative for tenderness, breast mass and discharge.   Genitourinary: Negative for dysuria, frequency, genital sores, hematuria, pelvic pain, urgency and vaginal bleeding.   Musculoskeletal: Negative for arthralgias, joint swelling and myalgias.   Skin: Negative for rash.   Neurological: Negative for dizziness, weakness, headaches and paresthesias.   Psychiatric/Behavioral: Negative for mood changes. The patient is not nervous/anxious.        OBJECTIVE:   /68 (BP Location: Left arm, Patient Position: Sitting, Cuff Size: Adult Regular)   Pulse 68   Temp 98.3  F (36.8  C) (Tympanic)   Resp 16   Ht 1.618 m (5' 3.7\")   Wt 59.8 kg (131 lb 14.4 oz)   LMP  (LMP Unknown)   SpO2 97%   Breastfeeding No   BMI 22.85 kg/m   Estimated body mass index is 22.85 kg/m  as calculated from the following:    Height as of this encounter: 1.618 m (5' 3.7\").    Weight as of this encounter: 59.8 kg (131 lb 14.4 oz).  Physical Exam  Constitutional:       General: She is not in acute distress.  HENT:      Head: Normocephalic and atraumatic.      Right Ear: External ear normal.      Left Ear: External ear normal.      Mouth/Throat:      Pharynx: No oropharyngeal exudate.   Eyes:      Extraocular Movements: Extraocular movements intact.   Cardiovascular:      Rate and Rhythm: Normal rate and regular rhythm.      Heart sounds: " "Normal heart sounds.   Pulmonary:      Effort: Pulmonary effort is normal. No respiratory distress.      Breath sounds: Normal breath sounds. No wheezing or rhonchi.   Chest:          Comments: During breast exam, patient had a muscle spasm in the back   Abdominal:      Palpations: Abdomen is soft. There is no mass.      Tenderness: There is no abdominal tenderness.   Musculoskeletal:         General: No deformity. Normal range of motion.      Cervical back: Normal range of motion and neck supple.   Skin:     General: Skin is warm.      Findings: No rash.   Neurological:      General: No focal deficit present.      Mental Status: She is alert and oriented to person, place, and time.   Psychiatric:         Mood and Affect: Mood normal.         ASSESSMENT / PLAN:   1. Encounter for Medicare annual wellness exam  2. Need for hepatitis C screening test  - REVIEW OF HEALTH MAINTENANCE PROTOCOL ORDERS  - Lipid panel reflex to direct LDL Fasting  - Basic metabolic panel  (Ca, Cl, CO2, Creat, Gluc, K, Na, BUN)  - Hepatitis C Screen Reflex to HCV RNA Quant and Genotype    3. Age-related osteoporosis without current pathological fracture  > patient declines treatment with alendronate, states she took it for 1 month, then was told she couldn't donate plasma in the past and since then feels like it isn't a safe medication   - went over side effects of bisphosphonate treatment, patient reports she is still not interested in initiating treatment at this time   - discussed weight bearing exercises   - patient is taking over the counter \"bone health\" supplement, but unsure of name or contents of the medication   - Vitamin D Deficiency     4. Breast tenderness in female  > patient declines mammogram for routine breast cancer screening because of radiation exposure, informed patient that radiation exposure is weighed with risks and benefits and that benefits often outweigh the risk with breast cancer screening   > patient's mother " was diagnosed with breast cancer when she was over the age of 65   > tenderness to palpation was noted in both right and left upper inner quadrants   - US Breast Right Limited 1-3 Quadrants; Future      Patient has been advised of split billing requirements and indicates understanding: Yes      COUNSELING:  Reviewed preventive health counseling, as reflected in patient instructions        She reports that she has never smoked. She has never used smokeless tobacco.      Appropriate preventive services were discussed with this patient, including applicable screening as appropriate for cardiovascular disease, diabetes, osteopenia/osteoporosis, and glaucoma.  As appropriate for age/gender, discussed screening for colorectal cancer, prostate cancer, breast cancer, and cervical cancer. Checklist reviewing preventive services available has been given to the patient.    Reviewed patients plan of care and provided an AVS. The Basic Care Plan (routine screening as documented in Health Maintenance) for Mana meets the Care Plan requirement. This Care Plan has been established and reviewed with the Patient.          GUERRERO FRIAS MD  [facility]    Identified Health Risks:

## 2022-11-30 NOTE — RESULT ENCOUNTER NOTE
Carolina Govea,     It was a pleasure speaking with you. I have received and reviewed your lab results, and have the following recommendations:     Congrats! All your labs are within normal limits, no further or additional workup is necessary. Please complete your breast ultrasound when possible. The results will be available to me and you via [employer] if you would like to set that up.     Sincerely,     Fang Otto MD

## 2022-12-26 ENCOUNTER — HOSPITAL ENCOUNTER (OUTPATIENT)
Dept: MAMMOGRAPHY | Facility: CLINIC | Age: 67
Discharge: HOME | End: 2022-12-26
Attending: STUDENT IN AN ORGANIZED HEALTH CARE EDUCATION/TRAINING PROGRAM
Payer: COMMERCIAL

## 2022-12-26 DIAGNOSIS — N64.4 BREAST TENDERNESS IN FEMALE: ICD-10-CM

## 2022-12-26 PROCEDURE — 77062 BREAST TOMOSYNTHESIS BI: CPT

## 2022-12-26 NOTE — LETTER
December 26, 2022      Mana Govea  [address] [facility] [address]        Dear ,    We are writing to inform you of your test results.      Your mammogram results are within normal limits you do not need another mammogram until next year.       Resulted Orders   MA Diagnostic Bilateral w/Cruz    Narrative    MA DIAGNOSTIC BILATERAL WITH TOMOSYNTHESIS  12/26/2022 10:39 AM    HISTORY:  Bilateral medial/sternal chest pain during annual breast  examination. Currently, no breast symptoms.    COMPARISON:  None available.    BREAST DENSITY: Heterogeneously dense.    FINDINGS:  No spiculated mass, asymmetry, architectural distortion, or  suspicious appearing microcalcifications.      Impression    IMPRESSION: BI-RADS CATEGORY: 1 -  Negative    RECOMMENDED FOLLOW-UP: Annual Mammography.    ALICIA RAE MD         SYSTEM ID:  [id number]       If you have any questions or concerns, please call the clinic at the number listed above.       Sincerely,      Fang Otto MD

## 2022-12-26 NOTE — RESULT ENCOUNTER NOTE
Congratulations!    Your mammogram results are within normal limits you do not need another mammogram until next year.     Sincerely,     Fang Otto MD

## 2022-12-26 NOTE — LETTER
Mana Govea  [address] [facility] [address]        December 26, 2022    Date of Exam:     Dear Mana:    Thank you for your recent visit.     Breast Imaging Result: We are pleased to inform you that the results of your recent breast imaging show no evidence of malignancy (cancer).    Breast Density: Your breast imaging shows that you have dense breast tissue. This means you have slightly more risk of getting breast cancer. It also means your breast imaging will be harder to read. While it's harder to read, it's still important to do breast imaging. In fact, yearly breast imaging is even more important for anyone at higher risk. You may need to do more testing if you have enough risk.    If you are having any problems such as a lump or pain in your breast, please discuss this with your health care team if you haven't already. You may need more testing.    As you know, it's important to find cancer early. Not all cancers are found through breast imaging, but it's the most accurate method for finding cancer early. A complete check should include breast imaging, physical exams, and breast self-exams. The American College of Radiology and the Society of Breast Imaging advises that yearly breast imaging should start at age 40.    A report of your breast imaging results was sent to: Fang Otto    Your breast imaging will become part of your medical file here at [facility] for at least 10 years. You are responsible for telling any new health care team or breast imaging site of the date and place of this exam.     We appreciate the opportunity to participate in your health care.    Sincerely,  Domenico Gallagher MD  [facility]

## 2023-01-05 ENCOUNTER — TELEPHONE (OUTPATIENT)
Dept: FAMILY MEDICINE | Facility: CLINIC | Age: 68
End: 2023-01-05

## 2023-01-05 NOTE — TELEPHONE ENCOUNTER
Patient Quality Outreach    Patient is due for the following:   Colon Cancer Screening  Physical Annual Wellness Visit    Next Steps:   Schedule a Annual Wellness Visit    Type of outreach:    Sent letter.    Next Steps:  Reach out within 90 days via Letter.    Max number of attempts reached: No. Will try again in 90 days if patient still on fail list.    Questions for provider review:    None     MIRIAM Morgan LPN

## 2023-01-05 NOTE — LETTER
January 5, 2023      Mana J Jeferson  [address] [facility] [address]    Your team at [facility] cares about your health. We have reviewed your chart and based on our findings; we are making the following recommendations to better manage your health.     You are in particular need of attention regarding the following:     Call or MyChart message your clinic to schedule a colonoscopy, schedule/ a FIT Test, or order a Cologuard test. If you are unsure what type of test you need, please call your clinic and speak to clinic staff.   Colon cancer is now the second leading cause of cancer-related deaths in the [place] for both men and women and there are over 130,000 new cases and 50,000 deaths per year from colon cancer. Colonoscopies can prevent 90-95% of these deaths. Problem lesions can be removed before they ever become cancer. This test is not only looking for cancer, but also getting rid of precancerous lesions.   If you are under/uninsured, we recommend you contact the Smart[employer]oot Program.SmartShoot is a free colorectal cancer screening program that provides colonoscopies for eligible under/uninsured [place] men and women. If you are interested in receiving a free colonoscopy, please call SmartShoot at t [phone] (mention code ScopesWeb) to see if you're eligible. Please have them send us the results.   PREVENTATIVE VISIT: Annual Medicare Wellness:Schedule an Annual Medicare Wellness Exam. Please call your [facility] clinic to set up your appointment.    If you have already completed these items, please contact the clinic via phone or   [employer] so your care team can review and update your records. Thank you for   choosing [facility] Clinics for your healthcare needs. For any questions,   concerns, or to schedule an appointment please contact our clinic at [phone].    Healthy Regards,      Your [facility] Care Team

## 2023-03-16 ENCOUNTER — TELEPHONE (OUTPATIENT)
Dept: SURGERY | Facility: CLINIC | Age: 68
End: 2023-03-16
Payer: COMMERCIAL

## 2023-03-16 NOTE — TELEPHONE ENCOUNTER
Screening Questions  BLUE  KIND OF PREP RED  LOCATION [review exclusion criteria] GREEN  SEDATION TYPE        N Are you active on mychart?       [place] Ordering/Referring Provider?        [employer] What type of coverage do you have?      N Have you had a positive covid test in the last 14 days?     22.85 1. BMI  [BMI 40+ - review exclusion criteria]    Y  2. Are you able to give consent for your medical care? [IF NO,RN REVIEW]          N  3. Are you taking any prescription pain medications on a routine schedule   (ex narcotics: oxycodone, roxicodone, oxycontin,  and percocet)? [RN Review]        N 3a. EXTENDED PREP What kind of prescription?     N 4. Do you have any chemical dependencies such as alcohol, street drugs, or methadone?        **If yes 3- 5 , please schedule with MAC sedation.**          IF YES TO ANY 6 - 10 - HOSPITAL SETTING ONLY.     N 6.   Do you need assistance transferring?     N 7.   Have you had a heart or lung transplant?    N 8.   Are you currently on dialysis?   N 9.   Do you use daily home oxygen?   n 10. Do you take nitroglycerin?   10a. n If yes, how often?     11. [FEMALES]  N Are you currently pregnant?    11a. N If yes, how many weeks? [ Greater than 12 weeks, OR NEEDED]    N 12. Do you have Pulmonary Hypertension? *NEED PAC APPT AT [facility] w/ MAC*     N 13. [review exclusion criteria]  Do you have any implantable devices in your body (pacemaker, defib, LVAD)?    N 14. In the past 6 months, have you had any heart related issues including cardiomyopathy or heart attack?     14a. N If yes, did it require cardiac stenting if so when?     N 15. Have you had a stroke or Transient ischemic attack (TIA - aka  mini stroke ) within 6 months?      N 16. Do you have mod to severe Obstructive Sleep Apnea?  [Hospital only]    n 17. Do you have SEVERE AND UNCONTROLLED asthma? *NEED PAC APPT AT [facility] w/MAC*     18. Are you currently taking any blood thinners?     18a. No. Continue to 19.   18b. Yes/no  "Blood Thinner: No [CONTINUE TO #19]    N 19. Do you take the medication Phentermine?    19a. If yes, \"Hold for 7 days before procedure.  Please consult your prescribing provider if you have questions about holding this medication.\"     N  20. Do you have chronic kidney disease?      N  21. Do you have a diagnosis of diabetes?     N  22. On a regular basis do you go 3-5 days between bowel movements?     See below 23. Preferred LOCAL Pharmacy for Pre Prescription    [ LIST ONLY ONE PHARMACY]        [facility] - [place] - [address] [facility]        - CLOSING REMINDERS -    Informed patient they will need an adult    Cannot take any type of public or medical transportation alone    Conscious Sedation- Needs  for 6 hours after the procedure       MAC/General-Needs  for 24 hours after procedure    Pre-Procedure Covid test to be completed [[facility] PCR Testing Required]    Confirmed Nurse will call to complete assessment       - SCHEDULING DETAILS -  N Hospital Setting Required? If yes, what is the exclusion?: N   [place]  Surgeon    6-5-23  Date of Procedure  Lower Endoscopy [Colonoscopy]  Type of Procedure Scheduled  [facility]-If you answer yes to questions #8, #20, #21Which Colonoscopy Prep was Sent?     GEN Sedation Type     N PAC / Pre-op Required                 "

## 2023-03-23 ENCOUNTER — HOSPITAL ENCOUNTER (OUTPATIENT)
Dept: MRI IMAGING | Facility: CLINIC | Age: 68
Discharge: HOME | End: 2023-03-23
Attending: CHIROPRACTOR | Admitting: CHIROPRACTOR
Payer: COMMERCIAL

## 2023-03-23 DIAGNOSIS — R29.898 SHOULDER WEAKNESS: ICD-10-CM

## 2023-03-23 DIAGNOSIS — M25.611 DECREASED RIGHT SHOULDER RANGE OF MOTION: ICD-10-CM

## 2023-03-23 PROCEDURE — 73221 MRI JOINT UPR EXTREM W/O DYE: CPT | Mod: 26 | Performed by: RADIOLOGY

## 2023-03-23 PROCEDURE — 73221 MRI JOINT UPR EXTREM W/O DYE: CPT | Mod: RT

## 2023-05-23 ENCOUNTER — TELEPHONE (OUTPATIENT)
Dept: FAMILY MEDICINE | Facility: CLINIC | Age: 68
End: 2023-05-23
Payer: COMMERCIAL

## 2023-05-23 NOTE — LETTER
May 23, 2023      Mana MINERVA Govea  [address] [facility] [address]    Your team at [facility] cares about your health. We have reviewed your chart and based on our findings; we are making the following recommendations to better manage your health.     You are in particular need of attention regarding the following:     Call or MyChart message your clinic to schedule a colonoscopy, schedule/ a FIT Test, or order a Cologuard test. If you are unsure what type of test you need, please call your clinic and speak to clinic staff.   Colon cancer is now the second leading cause of cancer-related deaths in the [place] for both men and women and there are over 130,000 new cases and 50,000 deaths per year from colon cancer. Colonoscopies can prevent 90-95% of these deaths. Problem lesions can be removed before they ever become cancer. This test is not only looking for cancer, but also getting rid of precancerous lesions.   If you are under/uninsured, we recommend you contact the Topic Program.Topic is a free colorectal cancer screening program that provides colonoscopies for eligible under/uninsured [place] men and women. If you are interested in receiving a free colonoscopy, please call Topic at t [phone] (mention code ScopesWeb) to see if you're eligible. Please have them send us the results.   PREVENTATIVE VISIT: Annual Medicare Wellness:Schedule an Annual Medicare Wellness Exam. Please call your [facility] clinic to set up your appointment.    If you have already completed these items, please contact the clinic via phone or   [employer] so your care team can review and update your records. Thank you for   choosing [facility] Clinics for your healthcare needs. For any questions,   concerns, or to schedule an appointment please contact our clinic at [phone].    Healthy Regards,      Your [facility] Care Team

## 2023-05-23 NOTE — TELEPHONE ENCOUNTER
Patient Quality Outreach    Patient is due for the following:   Colon Cancer Screening  Physical Annual Wellness Visit    Next Steps:   Schedule a Annual Wellness Visit    Type of outreach:    Sent letter.    Next Steps:  Reach out within 90 days via Letter.    Max number of attempts reached: Yes. Will try again in 90 days if patient still on fail list.    Questions for provider review:    None           Raya MADRID LPN

## 2023-06-14 RX ORDER — BISACODYL 5 MG/1
TABLET, DELAYED RELEASE ORAL
Qty: 4 TABLET | Refills: 0 | Status: SHIPPED | OUTPATIENT
Start: 2023-06-14 | End: 2023-12-05

## 2023-06-21 ENCOUNTER — ANESTHESIA EVENT (OUTPATIENT)
Dept: GASTROENTEROLOGY | Facility: CLINIC | Age: 68
End: 2023-06-21
Payer: COMMERCIAL

## 2023-06-21 NOTE — ANESTHESIA PREPROCEDURE EVALUATION
Anesthesia Pre-Procedure Evaluation    Patient: Mana Govea   MRN: [id number] : 1955        Procedure : Procedure(s):  Colonoscopy          Past Medical History:   Diagnosis Date     Other and unspecified hyperlipidemia       History reviewed. No pertinent surgical history.   No Known Allergies   Social History     Tobacco Use     Smoking status: Never     Smokeless tobacco: Never   Substance Use Topics     Alcohol use: No      Wt Readings from Last 1 Encounters:   22 59.8 kg (131 lb 14.4 oz)        Anesthesia Evaluation            ROS/MED HX  ENT/Pulmonary:       Neurologic:       Cardiovascular:     (+) Dyslipidemia -----    METS/Exercise Tolerance:     Hematologic:       Musculoskeletal:       GI/Hepatic:       Renal/Genitourinary:       Endo:       Psychiatric/Substance Use:       Infectious Disease:       Malignancy:       Other:            Physical Exam    Airway        Mallampati: II   TM distance: > 3 FB   Neck ROM: full   Mouth opening: > 3 cm    Respiratory Devices and Support  Comment: Not on oxygen currently       Dental           Cardiovascular   cardiovascular exam normal          Pulmonary   pulmonary exam normal                OUTSIDE LABS:  CBC:   Lab Results   Component Value Date    WBC 8.2 2005    WBC 10.4 2004    HGB 12.1 2005    HGB 12.9 2004    HCT 36.1 2005    HCT 39.5 2004     2005     2004     BMP:   Lab Results   Component Value Date     2022     07/15/2021    POTASSIUM 4.4 2022    POTASSIUM 4.3 07/15/2021    CHLORIDE 103 2022    CHLORIDE 106 07/15/2021    CO2 30 (H) 2022    CO2 29 07/15/2021    BUN 11.8 2022    BUN 13 07/15/2021    CR 0.67 2022    CR 0.74 07/15/2021    GLC 86 2022    GLC 90 07/15/2021     COAGS: No results found for: PTT, INR, FIBR  POC: No results found for: BGM, HCG, HCGS  HEPATIC:   Lab Results   Component Value Date    ALBUMIN 4.1  05/13/2004    PROTTOTAL 7.1 05/13/2004    ALT 24 10/13/2021    AST 25 05/13/2004    ALKPHOS 61 05/13/2004    BILITOTAL 1.7 (H) 05/13/2004     OTHER:   Lab Results   Component Value Date    MADISON 10.1 11/28/2022    LIPASE 43 05/13/2004    AMYLASE <30 (L) 05/13/2004       Anesthesia Plan    ASA Status:  2      Anesthesia Type: General.   Induction: Intravenous, Propofol.   Maintenance: TIVA.        Consents    Anesthesia Plan(s) and associated risks, benefits, and realistic alternatives discussed. Questions answered and patient/representative(s) expressed understanding.     - Discussed: Risks, Benefits and Alternatives for BOTH SEDATION and the PROCEDURE were discussed     - Discussed with:  Patient         Postoperative Care    Pain management: IV analgesics, Oral pain medications, Multi-modal analgesia.   PONV prophylaxis: Ondansetron (or other 5HT-3), Dexamethasone or Solumedrol     Comments:    Other Comments: Patient aware of plan, what to expect, and potential risks. Timeout was performed before bringing the patient back to OR, and once again, patient was asked if they had any questions            BARNEY Dahl CRNA

## 2023-06-23 ENCOUNTER — HOSPITAL ENCOUNTER (OUTPATIENT)
Facility: CLINIC | Age: 68
Discharge: HOME | End: 2023-06-23
Admitting: SURGERY
Payer: COMMERCIAL

## 2023-06-23 ENCOUNTER — ANESTHESIA (OUTPATIENT)
Dept: GASTROENTEROLOGY | Facility: CLINIC | Age: 68
End: 2023-06-23
Payer: COMMERCIAL

## 2023-06-23 VITALS
TEMPERATURE: 97.9 F | HEIGHT: 64 IN | WEIGHT: 131.84 LBS | RESPIRATION RATE: 16 BRPM | BODY MASS INDEX: 22.51 KG/M2 | SYSTOLIC BLOOD PRESSURE: 108 MMHG | HEART RATE: 57 BPM | OXYGEN SATURATION: 100 % | DIASTOLIC BLOOD PRESSURE: 77 MMHG

## 2023-06-23 DIAGNOSIS — Z12.11 SPECIAL SCREENING FOR MALIGNANT NEOPLASMS, COLON: Primary | ICD-10-CM

## 2023-06-23 DIAGNOSIS — E78.5 HYPERLIPIDEMIA LDL GOAL <100: ICD-10-CM

## 2023-06-23 LAB — COLONOSCOPY: NORMAL

## 2023-06-23 PROCEDURE — 258N000003 HC RX IP 258 OP 636: Performed by: SURGERY

## 2023-06-23 PROCEDURE — 250N000011 HC RX IP 250 OP 636

## 2023-06-23 PROCEDURE — 45380 COLONOSCOPY AND BIOPSY: CPT | Mod: PT | Performed by: SURGERY

## 2023-06-23 PROCEDURE — 370N000017 HC ANESTHESIA TECHNICAL FEE, PER MIN: Performed by: SURGERY

## 2023-06-23 PROCEDURE — 250N000009 HC RX 250: Performed by: SURGERY

## 2023-06-23 PROCEDURE — 88305 TISSUE EXAM BY PATHOLOGIST: CPT | Mod: TC | Performed by: SURGERY

## 2023-06-23 PROCEDURE — 45378 DIAGNOSTIC COLONOSCOPY: CPT | Performed by: SURGERY

## 2023-06-23 RX ORDER — LIDOCAINE 40 MG/G
CREAM TOPICAL
Status: DISCONTINUED | OUTPATIENT
Start: 2023-06-23 | End: 2023-06-23 | Stop reason: HOSPADM

## 2023-06-23 RX ORDER — SODIUM CHLORIDE, SODIUM LACTATE, POTASSIUM CHLORIDE, CALCIUM CHLORIDE 600; 310; 30; 20 MG/100ML; MG/100ML; MG/100ML; MG/100ML
INJECTION, SOLUTION INTRAVENOUS CONTINUOUS
Status: DISCONTINUED | OUTPATIENT
Start: 2023-06-23 | End: 2023-06-23 | Stop reason: HOSPADM

## 2023-06-23 RX ORDER — ONDANSETRON 2 MG/ML
4 INJECTION INTRAMUSCULAR; INTRAVENOUS EVERY 30 MIN PRN
Status: DISCONTINUED | OUTPATIENT
Start: 2023-06-23 | End: 2023-06-23 | Stop reason: HOSPADM

## 2023-06-23 RX ORDER — ATORVASTATIN CALCIUM 20 MG/1
TABLET, FILM COATED ORAL
Qty: 90 TABLET | Refills: 3 | Status: SHIPPED | OUTPATIENT
Start: 2023-06-23 | End: 2023-12-05

## 2023-06-23 RX ORDER — ONDANSETRON 4 MG/1
4 TABLET, ORALLY DISINTEGRATING ORAL EVERY 30 MIN PRN
Status: DISCONTINUED | OUTPATIENT
Start: 2023-06-23 | End: 2023-06-23 | Stop reason: HOSPADM

## 2023-06-23 RX ORDER — ALBUTEROL SULFATE 0.83 MG/ML
2.5 SOLUTION RESPIRATORY (INHALATION) EVERY 4 HOURS PRN
Status: DISCONTINUED | OUTPATIENT
Start: 2023-06-23 | End: 2023-06-23 | Stop reason: HOSPADM

## 2023-06-23 RX ORDER — PROPOFOL 10 MG/ML
INJECTION, EMULSION INTRAVENOUS PRN
Status: DISCONTINUED | OUTPATIENT
Start: 2023-06-23 | End: 2023-06-23

## 2023-06-23 RX ADMIN — PROPOFOL 200 MCG/KG/MIN: 10 INJECTION, EMULSION INTRAVENOUS at 13:18

## 2023-06-23 RX ADMIN — SODIUM CHLORIDE, POTASSIUM CHLORIDE, SODIUM LACTATE AND CALCIUM CHLORIDE: 600; 310; 30; 20 INJECTION, SOLUTION INTRAVENOUS at 12:56

## 2023-06-23 RX ADMIN — LIDOCAINE HYDROCHLORIDE 0.1 ML: 10 INJECTION, SOLUTION EPIDURAL; INFILTRATION; INTRACAUDAL; PERINEURAL at 12:56

## 2023-06-23 RX ADMIN — PROPOFOL 100 MG: 10 INJECTION, EMULSION INTRAVENOUS at 13:17

## 2023-06-23 ASSESSMENT — ACTIVITIES OF DAILY LIVING (ADL): ADLS_ACUITY_SCORE: 35

## 2023-06-23 NOTE — LETTER
Mana Govea  [address] [facility] [address]    June 30, 2023    Dear Mana,  This letter is written to inform you of the results of your recent colonoscopy.  Your examination showed polyp(s) in your cecum and rectum. All polyps were removed in their entirety and sent for review by a pathologist. As you will see on the pathology report below, the tissue(s) were tubular adenomatous polyps and hyperplastic polyps. Your examination was otherwise without abnormality.    Final Diagnosis   A: Large intestine, cecum, polyp, biopsy/polypectomy:  - Tubular adenoma(s) negative for high-grade dysplasia.      B: Large intestine, rectum, polyp, biopsy/polypectomy:  - Hyperplastic polyp negative for dysplasia or malignancy.         Adenomatous polyps are entirely benign (non-cancerous); however, patients who have developed these polyps are at an increased risk for developing additional polyps in the future. If these are not eventually removed, there is a risk of developing colon cancer. We will advise more frequent examinations with you because of the risk associated with this type of polyp.  Hyperplastic polyps are entirely benign (non-cancerous) and rarely associated with the development of additional polyps or colorectal cancer.    Given these findings,  I recommend that you undergo a repeat colonoscopy in 5 year(s) for surveillance. We will enter you into a recall system so you receive a reminder closer to the time that you are due for repeat examination.     Please remember that this recommendation is made with the understanding that you are not experiencing persistent changes in bowel function, bleeding per rectum, and/or significant abdominal pain. If you experience these symptoms, please contact your primary care provider for a further evaluation.     If you have any questions or concerns about the results of your colonoscopy or the appropriate follow-up, please contact my assistant at  [phone].    Sincerely,        [facility] fon[facility]a FACOS [facility]  ___

## 2023-06-23 NOTE — H&P
[facility]    Pre-Endoscopy History and Physical     Mana Govea MRN# [id number]   YOB: 1955 Age: 67 year old     Date of Procedure: 6/23/2023  Primary care provider: No Ref-Primary, Physician  Type of Endoscopy: Colonoscopy with possible biopsy, possible polypectomy  Reason for Procedure: screening  Type of Anesthesia Anticipated: MAC    HPI:    Mana is a 67 year old female who will be undergoing the above procedure.      A history and physical has been performed. The patient's medications and allergies have been reviewed. The risks and benefits of the procedure and the sedation options and risks were discussed with the patient.  All questions were answered and informed consent was obtained.      She denies a personal or family history of anesthesia complications or bleeding disorders.     Patient Active Problem List   Diagnosis     Hyperlipidemia LDL goal <130     History of pneumonia        Past Medical History:   Diagnosis Date     Other and unspecified hyperlipidemia         History reviewed. No pertinent surgical history.    Social History     Tobacco Use     Smoking status: Never     Smokeless tobacco: Never   Substance Use Topics     Alcohol use: No       Family History   Problem Relation Age of Onset     Hypertension Mother      Heart Disease Father         valve;heart     Lipids Brother         Elevated chol.     Cancer Brother         lung       Prior to Admission medications    Medication Sig Start Date End Date Taking? Authorizing Provider   bisacodyl (DULCOLAX) 5 MG EC tablet Take 2 tablets at 3 pm the day before your procedure. If your procedure is before 11 am, take 2 additional tablets at 11 pm. If your procedure is after 11 am, take 2 additional tablets at 6 am. For additional instructions refer to your colonoscopy prep instructions. 6/14/23  Yes Dirk Cao MD   polyethylene glycol (GOLYTELY) 236 g suspension The night before the exam at 6  "pm drink an 8-ounce glass every 15 minutes until the jug is half empty. If you arrive before 11 AM: Drink the other half of the Golytely jug at 11 PM night before procedure. If you arrive after 11 AM: Drink the other half of the Golytely jug at 6 AM day of procedure. For additional instructions refer to your colonoscopy prep instructions. 6/14/23  Yes Dirk Cao MD   atorvastatin (LIPITOR) 20 MG tablet TAKE 1 TABLET ONCE DAILY 4/21/22   Raya Gutierrez APRN CNP       No Known Allergies     REVIEW OF SYSTEMS:   5 point ROS negative except as noted above in HPI, including Gen., Resp., CV, GI &  system review.    PHYSICAL EXAM:   /86   Pulse 80   Temp 97.9  F (36.6  C) (Oral)   Resp 15   Ht 1.618 m (5' 3.7\")   Wt 59.8 kg (131 lb 13.4 oz)   LMP  (LMP Unknown)   SpO2 95%   BMI 22.84 kg/m   Estimated body mass index is 22.84 kg/m  as calculated from the following:    Height as of this encounter: 1.618 m (5' 3.7\").    Weight as of this encounter: 59.8 kg (131 lb 13.4 oz).   Constitutional: Awake, alert, no acute distress.  Eyes: No scleral icterus.  Conjunctiva are without injection.  ENMT: Mucous membranes moist, dentition and gums are intact.   Neck: Soft, supple, trachea midline.    Endocrine: n/a   Lymphatic: There is no cervical, submandibularadenopathy.  Respiratory: normal effortgs   Cardiovascular: S1, S2  Abdomen: Non-distended, non-tender,  No masses,  Musculoskeletal: No spinal or CVA tenderness. Full range of motion in the upper and lower extremities.    Skin: No skin rashes or lesions to inspection.  No petechia.    Neurologic: alerted and oriented 3x  Psychiatric: The patient's affect is not blunted and mood is appropriate.  DIAGNOSTICS:    Not indicated    IMPRESSION   ASA Class 2 - Mild systemic disease    PLAN:   Plan for Colonoscopy with possible biopsy, possible polypectomy. We discussed the risks, benefits and alternatives and the patient wished to proceed.  Patient is " cleared for the above procedure.    The above has been forwarded to the consulting provider.    [facility], [facility]

## 2023-06-23 NOTE — ANESTHESIA POSTPROCEDURE EVALUATION
Patient: Mana Govea    Procedure: Procedure(s):  Colonoscopy with polypectomy       Anesthesia Type:  General    Note:  Disposition: Outpatient   Postop Pain Control: Uneventful            Sign Out: Well controlled pain   PONV: No   Neuro/Psych: Uneventful            Sign Out: Acceptable/Baseline neuro status   Airway/Respiratory: Uneventful            Sign Out: Acceptable/Baseline resp. status   CV/Hemodynamics: Uneventful            Sign Out: Acceptable CV status; No obvious hypovolemia; No obvious fluid overload   Other NRE:    DID A NON-ROUTINE EVENT OCCUR?            Last vitals:  Vitals Value Taken Time   BP 81/37 06/23/23 1336   Temp     Pulse 58 06/23/23 1336   Resp     SpO2 95 % 06/23/23 1340   Vitals shown include unvalidated device data.    Electronically Signed By: BARNEY Matthews CRNA  June 23, 2023  1:41 PM

## 2023-06-23 NOTE — ANESTHESIA CARE TRANSFER NOTE
Patient: Mana Govea    Procedure: Procedure(s):  Colonoscopy with polypectomy       Diagnosis: Screen for colon cancer [Z12.11]  Diagnosis Additional Information: No value filed.    Anesthesia Type:   General     Note:    Oropharynx: oropharynx clear of all foreign objects  Level of Consciousness: awake      Independent Airway: airway patency satisfactory and stable  Dentition: dentition unchanged  Vital Signs Stable: post-procedure vital signs reviewed and stable  Report to RN Given: handoff report given  Patient transferred to: Phase II    Handoff Report: Identifed the Patient, Identified the Reponsible Provider, Reviewed the pertinent medical history, Discussed the surgical course, Reviewed Intra-OP anesthesia mangement and issues during anesthesia, Set expectations for post-procedure period and Allowed opportunity for questions and acknowledgement of understanding      Vitals:  Vitals Value Taken Time   BP 81/37 06/23/23 1336   Temp     Pulse 58 06/23/23 1336   Resp     SpO2 95 % 06/23/23 1340   Vitals shown include unvalidated device data.    Electronically Signed By: BARNEY Matthews CRNA  June 23, 2023  1:41 PM

## 2023-06-27 LAB
PATH REPORT.COMMENTS IMP SPEC: NORMAL
PATH REPORT.COMMENTS IMP SPEC: NORMAL
PATH REPORT.FINAL DX SPEC: NORMAL
PATH REPORT.GROSS SPEC: NORMAL
PATH REPORT.MICROSCOPIC SPEC OTHER STN: NORMAL
PATH REPORT.RELEVANT HX SPEC: NORMAL
PHOTO IMAGE: NORMAL

## 2023-06-27 PROCEDURE — 88305 TISSUE EXAM BY PATHOLOGIST: CPT | Mod: 26 | Performed by: PATHOLOGY

## 2023-06-28 PROBLEM — D36.9 TUBULAR ADENOMA: Status: ACTIVE | Noted: 2023-06-28

## 2023-09-28 ENCOUNTER — TRANSFERRED RECORDS (OUTPATIENT)
Dept: HEALTH INFORMATION MANAGEMENT | Facility: CLINIC | Age: 68
End: 2023-09-28
Payer: COMMERCIAL

## 2023-10-02 ENCOUNTER — TRANSCRIBE ORDERS (OUTPATIENT)
Dept: OTHER | Age: 68
End: 2023-10-02

## 2023-10-02 DIAGNOSIS — R29.898 SHOULDER WEAKNESS: ICD-10-CM

## 2023-10-02 DIAGNOSIS — M25.511 RIGHT SHOULDER PAIN: Primary | ICD-10-CM

## 2023-10-04 ENCOUNTER — THERAPY VISIT (OUTPATIENT)
Dept: PHYSICAL THERAPY | Facility: CLINIC | Age: 68
End: 2023-10-04
Attending: ORTHOPAEDIC SURGERY
Payer: COMMERCIAL

## 2023-10-04 DIAGNOSIS — G89.29 CHRONIC RIGHT SHOULDER PAIN: Primary | ICD-10-CM

## 2023-10-04 DIAGNOSIS — M25.511 CHRONIC RIGHT SHOULDER PAIN: Primary | ICD-10-CM

## 2023-10-04 PROCEDURE — 97161 PT EVAL LOW COMPLEX 20 MIN: CPT | Mod: GP | Performed by: PHYSICAL THERAPIST

## 2023-10-04 PROCEDURE — 97110 THERAPEUTIC EXERCISES: CPT | Mod: GP | Performed by: PHYSICAL THERAPIST

## 2023-10-04 PROCEDURE — 97140 MANUAL THERAPY 1/> REGIONS: CPT | Mod: GP | Performed by: PHYSICAL THERAPIST

## 2023-10-04 NOTE — PROGRESS NOTES
PHYSICAL THERAPY EVALUATION  Type of Visit: Evaluation    See electronic medical record for Abuse and Falls Screening details.    Subjective R shoulder pain since March after a fall when downhill skiing. Pt notes lateral shoulder pain with aching around the area. Decrease in pain. MRI on file of tendinosis and fraying of the supraspinatus. Pt has seen [facility] orthopedics for an evaluation and will follow-up in the next 6-8 weeks in regards to pain and ROM. 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 She notes improvement to the area since starting to paint again and use the ROM.      Presenting condition or subjective complaint: R shoulder pain  Date of onset: 03/04/23 (fall occurred in March sometime)    Relevant medical history:     Dates & types of surgery:      Prior diagnostic imaging/testing results:       Prior therapy history for the same diagnosis, illness or injury: No      Prior Level of Function      Living Environment  Social support: With a significant other or spouse   Type of home: House     Employment: No    Hobbies/Interests: power walking    Patient goals for therapy: more movement w/o pain    Pain assessment: Pain present     Objective   SHOULDER EVALUATION  PAIN: Pain is Exacerbated By: reaching overhead in abduction motion and behind the back  POSTURE:  slightly rounded fwd on the R shoulder  ROM:  flex- 128*, ER- 43*, abd- 50*, IR- reaches back pocket  STRENGTH:  3-/5 flex, 2/5 abd, 3+/5 ER pain with motion, 4/5 IR with pain at the start but no pain throughout the session  FLEXIBILITY:  restriction with mobility and tension in the pec for decreasing mobility  SPECIAL TESTS:  + empty can, + Dmitriy  PALPATION:  tension to the supra and UT on the R, restricted pec motion  JOINT MOBILITY:  restricted motion  CERVICAL SCREEN:  limited SB, rot- WFL, flex and ext- WFL    Assessment & Plan   CLINICAL IMPRESSIONS  Medical  Diagnosis: Right shoulder pain (M25.511)  - Primary    Shoulder weakness (R29.898)    Treatment Diagnosis: decreased R shoulder ROM and pain   Impression/Assessment: Patient is a 68 year old female with R shoulder pain and decreased ROM complaints.  The following significant findings have been identified: Pain, Decreased ROM/flexibility, Decreased joint mobility, Decreased strength, Impaired muscle performance, and Decreased activity tolerance. These impairments interfere with their ability to perform work tasks, recreational activities, household chores, and driving  as compared to previous level of function.     Clinical Decision Making (Complexity):  Clinical Presentation: Stable/Uncomplicated  Clinical Presentation Rationale: based on medical and personal factors listed in PT evaluation  Clinical Decision Making (Complexity): Low complexity    PLAN OF CARE  Treatment Interventions:  Modalities: E-stim, Ultrasound  Interventions: Manual Therapy, Neuromuscular Re-education, Therapeutic Exercise    Long Term Goals     PT Goal 1  Goal Identifier: STG  Goal Description: Pt will have 150* of shoulder flex and 120* of shoulder abd to be able to reach her UE overhead for reaching into cupboards or for painting in 4 weeks.  Target Date: 11/01/23  PT Goal 2  Goal Identifier: LTG  Goal Description: Pt will have 5/5 shoulder strength to support the UE with lifting and various other movements in 10 weeks.  Target Date: 12/13/23  PT Goal 3  Goal Identifier: LTG  Goal Description: Pt will be able to reach T12 behind the back in 10 weeks for ful mobility and to be able to grab an object out of her pocket in 10 weeks.  Target Date: 12/13/23      Frequency of Treatment: 1x/wk  Duration of Treatment: 10 weeks    Education Assessment:   Learner/Method: Patient;Listening;Demonstration;No Barriers to Learning    Risks and benefits of evaluation/treatment have been explained.   Patient/Family/caregiver agrees with Plan of Care.      Evaluation Time:     PT Eval, Low Complexity Minutes (58582): 17       Signing Clinician: Sana Hernandez PT      [facility]                                                                                   OUTPATIENT PHYSICAL THERAPY      PLAN OF TREATMENT FOR OUTPATIENT REHABILITATION   Patient's Last Name, First Name, Mana Correa YOB: 1955   Provider's Name   [facility]   Medical Record No.  [id number]     Onset Date: 03/04/23 (fall occurred in March sometime)  Start of Care Date: 10/04/23     Medical Diagnosis:  Right shoulder pain (M25.511)  - Primary    Shoulder weakness (R29.898)      PT Treatment Diagnosis:  decreased R shoulder ROM and pain Plan of Treatment  Frequency/Duration: 1x/wk/ 10 weeks    Certification date from 10/04/23 to 12/13/23         See note for plan of treatment details and functional goals     Sana Hernnadez PT                         I CERTIFY THE NEED FOR THESE SERVICES FURNISHED UNDER        THIS PLAN OF TREATMENT AND WHILE UNDER MY CARE .             Physician Signature               Date    X_____________________________________________________                    Referring Provider:  Sam Emmanuel      Initial Assessment  See Epic Evaluation- Start of Care Date: 10/04/23

## 2023-10-17 ENCOUNTER — THERAPY VISIT (OUTPATIENT)
Dept: PHYSICAL THERAPY | Facility: CLINIC | Age: 68
End: 2023-10-17
Attending: ORTHOPAEDIC SURGERY
Payer: COMMERCIAL

## 2023-10-17 DIAGNOSIS — G89.29 CHRONIC RIGHT SHOULDER PAIN: Primary | ICD-10-CM

## 2023-10-17 DIAGNOSIS — M25.511 CHRONIC RIGHT SHOULDER PAIN: Primary | ICD-10-CM

## 2023-10-17 PROCEDURE — 97140 MANUAL THERAPY 1/> REGIONS: CPT | Mod: GP | Performed by: PHYSICAL THERAPIST

## 2023-10-17 PROCEDURE — 97110 THERAPEUTIC EXERCISES: CPT | Mod: GP | Performed by: PHYSICAL THERAPIST

## 2023-10-24 ENCOUNTER — THERAPY VISIT (OUTPATIENT)
Dept: PHYSICAL THERAPY | Facility: CLINIC | Age: 68
End: 2023-10-24
Attending: ORTHOPAEDIC SURGERY
Payer: COMMERCIAL

## 2023-10-24 DIAGNOSIS — G89.29 CHRONIC RIGHT SHOULDER PAIN: Primary | ICD-10-CM

## 2023-10-24 DIAGNOSIS — M25.511 CHRONIC RIGHT SHOULDER PAIN: Primary | ICD-10-CM

## 2023-10-24 PROCEDURE — 97110 THERAPEUTIC EXERCISES: CPT | Mod: GP | Performed by: PHYSICAL THERAPIST

## 2023-10-24 NOTE — PROGRESS NOTES
DISCHARGE  Reason for Discharge: pt felt able to continue independently at this time due to a decrease in aggravating Sx at this time.    Equipment Issued: none    Discharge Plan: Patient to continue home program.    Referring Provider:  Sam Emmanuel     10/24/23 0500   Appointment Info   Signing clinician's name / credentials Sana Hernandez, PT, DPT   Total/Authorized Visits 10 planned   Visits Used 3   Medical Diagnosis Right shoulder pain (M25.511)  - Primary    Shoulder weakness (R29.898)   PT Tx Diagnosis decreased R shoulder ROM and pain   Quick Adds Certification   Progress Note/Certification   Start of Care Date 10/04/23   Onset of illness/injury or Date of Surgery 03/04/23  (fall occurred in March sometime)   Therapy Frequency 1x/wk   Predicted Duration 10 weeks   Certification date from 10/04/23   Certification date to 12/13/23   Progress Note Due Date 12/13/23   Progress Note Completed Date 10/04/23   GOALS   PT Goals 2;3   PT Goal 1   Goal Identifier STG   Goal Description Pt will have 150* of shoulder flex and 120* of shoulder abd to be able to reach her UE overhead for reaching into cupboards or for painting in 4 weeks.   Target Date 11/01/23   Date Met 10/24/23   PT Goal 2   Goal Identifier LTG   Goal Description Pt will have 5/5 shoulder strength to support the UE with lifting and various other movements in 10 weeks.   Target Date 12/13/23   Date Met 10/24/23   PT Goal 3   Goal Identifier LTG   Goal Description Pt will be able to reach T12 behind the back in 10 weeks for ful mobility and to be able to grab an object out of her pocket in 10 weeks.   Goal Progress reached L3 behind the back   Target Date 12/13/23   Subjective Report   Subjective Report decrease R shoulder ROM, pain, supra tear. Pt notes no pain in the shoulder when waking up in the morning. Pt notes finishing up a paint job with improvement to the shoulder and a decrease in pain. Pt notes wishing to continue independently  at this time.   Objective Measures   Objective Measures Objective Measure 1   Objective Measure 1   Objective Measure shoulder flex- 150*, abd- 150*   Details unable to reach past L3 behind the back   Treatment Interventions (PT)   Interventions Therapeutic Procedure/Exercise;Manual Therapy   Therapeutic Procedure/Exercise   Therapeutic Procedures: strength, endurance, ROM, flexibillity minutes (45612) 23   Ther Proc 1 - Details standing bicep stretch against the wall modified- 20 sec holds, 4x;  wall washing- 25x;  SL ER- 25x;  wall washing with adduction- 10x   Skilled Intervention used to reduce pain and strengthen area, review HEP   Patient Response/Progress no pain with ER today   Education   Learner/Method Patient;Listening;Demonstration;No Barriers to Learning   Plan   Home program papers- scap ret with BTB, SL ER, bicep stretch, wall washing   Plan for next session discharge   Total Session Time   Timed Code Treatment Minutes 23   Total Treatment Time (sum of timed and untimed services) 23

## 2023-12-05 ENCOUNTER — OFFICE VISIT (OUTPATIENT)
Dept: FAMILY MEDICINE | Facility: CLINIC | Age: 68
End: 2023-12-05
Payer: COMMERCIAL

## 2023-12-05 VITALS
WEIGHT: 123.7 LBS | TEMPERATURE: 99 F | RESPIRATION RATE: 16 BRPM | HEART RATE: 68 BPM | HEIGHT: 64 IN | DIASTOLIC BLOOD PRESSURE: 64 MMHG | SYSTOLIC BLOOD PRESSURE: 90 MMHG | OXYGEN SATURATION: 95 % | BODY MASS INDEX: 21.12 KG/M2

## 2023-12-05 DIAGNOSIS — Z00.00 ENCOUNTER FOR ROUTINE ADULT HEALTH EXAMINATION WITHOUT ABNORMAL FINDINGS: Primary | ICD-10-CM

## 2023-12-05 DIAGNOSIS — M81.0 AGE-RELATED OSTEOPOROSIS WITHOUT CURRENT PATHOLOGICAL FRACTURE: ICD-10-CM

## 2023-12-05 DIAGNOSIS — E78.5 HYPERLIPIDEMIA LDL GOAL <100: ICD-10-CM

## 2023-12-05 PROCEDURE — G0438 PPPS, INITIAL VISIT: HCPCS | Performed by: STUDENT IN AN ORGANIZED HEALTH CARE EDUCATION/TRAINING PROGRAM

## 2023-12-05 PROCEDURE — 99214 OFFICE O/P EST MOD 30 MIN: CPT | Mod: 25 | Performed by: STUDENT IN AN ORGANIZED HEALTH CARE EDUCATION/TRAINING PROGRAM

## 2023-12-05 RX ORDER — ATORVASTATIN CALCIUM 20 MG/1
TABLET, FILM COATED ORAL
Qty: 90 TABLET | Refills: 3 | Status: SHIPPED | OUTPATIENT
Start: 2023-12-05 | End: 2023-12-15

## 2023-12-05 ASSESSMENT — ENCOUNTER SYMPTOMS
SHORTNESS OF BREATH: 0
ABDOMINAL PAIN: 0
BREAST MASS: 0
HEARTBURN: 0
HEMATOCHEZIA: 0
DIZZINESS: 0
NERVOUS/ANXIOUS: 0
WEAKNESS: 0
CHILLS: 0
DIARRHEA: 0
CONSTIPATION: 0
PARESTHESIAS: 0
FREQUENCY: 0
NAUSEA: 0
HEADACHES: 0
COUGH: 0
DYSURIA: 0
JOINT SWELLING: 0
SORE THROAT: 0
MYALGIAS: 0
ARTHRALGIAS: 0
FEVER: 0
EYE PAIN: 0
PALPITATIONS: 0
HEMATURIA: 0

## 2023-12-05 ASSESSMENT — ACTIVITIES OF DAILY LIVING (ADL): CURRENT_FUNCTION: NO ASSISTANCE NEEDED

## 2023-12-05 ASSESSMENT — PAIN SCALES - GENERAL: PAINLEVEL: NO PAIN (0)

## 2023-12-05 NOTE — PROGRESS NOTES
"SUBJECTIVE:   Mana is a 68 year old, presenting for the following:  AWV and Health Maintenance (No shots)        12/5/2023     1:51 PM   Additional Questions   Roomed by jayda underwood   Accompanied by self         12/5/2023     1:51 PM   Patient Reported Additional Medications   Patient reports taking the following new medications none       Are you in the first 12 months of your Medicare coverage?  No    Healthy Habits:     In general, how would you rate your overall health?  Excellent    Frequency of exercise:  2-3 days/week    Duration of exercise:  Less than 15 minutes    Do you usually eat at least 4 servings of fruit and vegetables a day, include whole grains    & fiber and avoid regularly eating high fat or \"junk\" foods?  No    Taking medications regularly:  Yes    Medication side effects:  Not applicable and None    Ability to successfully perform activities of daily living:  No assistance needed    Home Safety:  Lack of grab bars in the bathroom    Hearing Impairment:  No hearing concerns    In the past 6 months, have you been bothered by leaking of urine?  No    In general, how would you rate your overall mental or emotional health?  Excellent    Additional concerns today:  No      Today's PHQ-2 Score:       12/5/2023     1:47 PM   PHQ-2 ( 1999 [employer])   Q1: Little interest or pleasure in doing things 0   Q2: Feeling down, depressed or hopeless 0   PHQ-2 Score 0   Q1: Little interest or pleasure in doing things Not at all   Q2: Feeling down, depressed or hopeless Not at all   PHQ-2 Score 0           Have you ever done Advance Care Planning? (For example, a Health Directive, POLST, or a discussion with a medical provider or your loved ones about your wishes): No, advance care planning information given to patient to review.  Patient declined advance care planning discussion at this time.       Fall risk  Fallen 2 or more times in the past year?: No  Any fall with injury in the past year?: No  Cognitive " Screening   1) Repeat 3 items (Leader, Season, Table)    2) Clock draw:  did not put the number in  3) 3 item recall: Recalls 3 objects  Results: 3 items recalled: COGNITIVE IMPAIRMENT LESS LIKELY    Mini-CogTM Copyright PRUDENCIO Strickland. Licensed by the author for use in [facility]; reprinted with permission (timothy@[facility]). All rights reserved.      Do you have sleep apnea, excessive snoring or daytime drowsiness? : no    Reviewed and updated as needed this visit by clinical staff   Tobacco  Allergies  Meds              Reviewed and updated as needed this visit by Provider                 Social History     Tobacco Use    Smoking status: Never    Smokeless tobacco: Never   Substance Use Topics    Alcohol use: No             12/5/2023     1:47 PM   Alcohol Use   Prescreen: >3 drinks/day or >7 drinks/week? No       Do you have a current opioid prescription? No  Do you use any other controlled substances or medications that are not prescribed by a provider? None      Hyperlipidemia Follow-Up    Are you regularly taking any medication or supplement to lower your cholesterol?   Yes- PM  Are you having muscle aches or other side effects that you think could be caused by your cholesterol lowering medication?  No    Current providers sharing in care for this patient include:   Patient Care Team:  No Ref-Primary, Physician as PCP - General  Fang Otto MD as Assigned PCP    The following health maintenance items are reviewed in Epic and correct as of today:  Health Maintenance   Topic Date Due    COVID-19 Vaccine (1) Never done    DTAP/TDAP/TD IMMUNIZATION (1 - Tdap) 06/12/2003    ZOSTER IMMUNIZATION (1 of 2) Never done    RSV VACCINE (Pregnancy & 60+) (1 - 1-dose 60+ series) Never done    Pneumococcal Vaccine: 65+ Years (1 - PCV) Never done    MEDICARE ANNUAL WELLNESS VISIT  07/05/2022    INFLUENZA VACCINE (1) Never done    ANNUAL REVIEW OF HM ORDERS  11/28/2023    FALL RISK ASSESSMENT  12/05/2024    MAMMO  SCREENING  12/26/2024    LIPID  11/28/2027    ADVANCE CARE PLANNING  11/28/2027    COLORECTAL CANCER SCREENING  06/23/2028    DEXA  07/15/2036    HEPATITIS C SCREENING  Completed    PHQ-2 (once per calendar year)  Completed    IPV IMMUNIZATION  Aged Out    HPV IMMUNIZATION  Aged Out    MENINGITIS IMMUNIZATION  Aged Out    RSV MONOCLONAL ANTIBODY  Aged Out           11/28/2022    12:35 PM 12/26/2022     9:57 AM 12/5/2023     1:48 PM   Breast CA Risk Assessment (FHS-7)   Do you have a family history of breast, colon, or ovarian cancer? Yes Yes No / Unknown       Mammogram Screening: Recommended mammography every 1-2 years with patient discussion and risk factor consideration  Pertinent mammograms are reviewed under the imaging tab.  Patient had mammogram in 2022, prefers to follow USPSTF guidelines as she doesn't like getting mammograms   Mother was diagnosed with breast cancer at age 85, no maternal aunts with breast cancer, patient's sister negative for cancer history     Review of Systems   Constitutional:  Negative for chills and fever.   HENT:  Negative for congestion, ear pain, hearing loss and sore throat.    Eyes:  Negative for pain and visual disturbance.   Respiratory:  Negative for cough and shortness of breath.    Cardiovascular:  Negative for chest pain, palpitations and peripheral edema.   Gastrointestinal:  Negative for abdominal pain, constipation, diarrhea, heartburn, hematochezia and nausea.   Breasts:  Negative for tenderness, breast mass and discharge.   Genitourinary:  Negative for dysuria, frequency, genital sores, hematuria, pelvic pain, urgency, vaginal bleeding and vaginal discharge.   Musculoskeletal:  Negative for arthralgias, joint swelling and myalgias.   Skin:  Negative for rash.   Neurological:  Negative for dizziness, weakness, headaches and paresthesias.   Psychiatric/Behavioral:  Negative for mood changes. The patient is not nervous/anxious.      OBJECTIVE:   BP 90/64 (BP Location:  "Right arm, Patient Position: Sitting, Cuff Size: Adult Regular)   Pulse 68   Temp 99  F (37.2  C) (Temporal)   Resp 16   Ht 1.62 m (5' 3.78\")   Wt 56.1 kg (123 lb 11.2 oz)   LMP  (LMP Unknown)   SpO2 95%   BMI 21.38 kg/m   Estimated body mass index is 21.38 kg/m  as calculated from the following:    Height as of this encounter: 1.62 m (5' 3.78\").    Weight as of this encounter: 56.1 kg (123 lb 11.2 oz).  Physical Exam  Constitutional:       General: She is not in acute distress.  HENT:      Head: Normocephalic and atraumatic.      Right Ear: External ear normal.      Left Ear: External ear normal.      Mouth/Throat:      Mouth: Mucous membranes are moist.      Pharynx: Oropharynx is clear. No oropharyngeal exudate or posterior oropharyngeal erythema.   Eyes:      Extraocular Movements: Extraocular movements intact.   Cardiovascular:      Rate and Rhythm: Normal rate and regular rhythm.      Heart sounds: Normal heart sounds.   Pulmonary:      Effort: Pulmonary effort is normal. No respiratory distress.      Breath sounds: Normal breath sounds. No wheezing or rhonchi.   Abdominal:      Palpations: Abdomen is soft. There is no mass.      Tenderness: There is no abdominal tenderness.   Musculoskeletal:         General: No deformity. Normal range of motion.      Cervical back: Normal range of motion and neck supple.   Skin:     General: Skin is warm.      Findings: No rash.   Neurological:      General: No focal deficit present.      Mental Status: She is alert and oriented to person, place, and time.   Psychiatric:         Mood and Affect: Mood normal.         Diagnostic Test Results:  Labs reviewed in Epic    ASSESSMENT / PLAN:     1. Encounter for routine adult health examination without abnormal findings  - declined mammogram for this year   - declined vaccines at today's visit   - up to date on colonoscopy, aged out of pap smears     2. Hyperlipidemia LDL goal <100  > patient admits she stopped taking her " lipitor 3 months ago   - refilled atorvastatin (LIPITOR) 20 MG tablet; TAKE 1 TABLET ONCE DAILY  Dispense: 90 tablet; Refill: 3  - Lipid panel reflex to direct LDL Fasting; Future, patient will schedule appointment with lab as she was not fasting today     3. Age-related osteoporosis without current pathological fracture  > recommended Vit D and calcium intake   - last DEXA was in 2021 with T score in osteoporotic ranges   - DX Hip/Pelvis/Spine; Future       Patient has been advised of split billing requirements and indicates understanding: Yes      COUNSELING:  Reviewed preventive health counseling, as reflected in patient instructions        She reports that she has never smoked. She has never used smokeless tobacco.      Appropriate preventive services were discussed with this patient, including applicable screening as appropriate for fall prevention, nutrition, physical activity, Tobacco-use cessation, weight loss and cognition.  Checklist reviewing preventive services available has been given to the patient.    Reviewed patients plan of care and provided an AVS. The Basic Care Plan (routine screening as documented in Health Maintenance) for Mana meets the Care Plan requirement. This Care Plan has been established and reviewed with the Patient.          GUERRERO FRIAS MD  [facility]    Identified Health Risks:  I have reviewed Opioid Use Disorder and Substance Use Disorder risk factors and made any needed referrals.

## 2023-12-15 ENCOUNTER — LAB (OUTPATIENT)
Dept: LAB | Facility: CLINIC | Age: 68
End: 2023-12-15
Payer: COMMERCIAL

## 2023-12-15 DIAGNOSIS — E78.5 HYPERLIPIDEMIA LDL GOAL <100: ICD-10-CM

## 2023-12-15 DIAGNOSIS — Z00.00 ENCOUNTER FOR ROUTINE ADULT HEALTH EXAMINATION WITHOUT ABNORMAL FINDINGS: ICD-10-CM

## 2023-12-15 LAB
CHOLEST SERPL-MCNC: 313 MG/DL
FASTING STATUS PATIENT QL REPORTED: YES
HDLC SERPL-MCNC: 63 MG/DL
LDLC SERPL CALC-MCNC: 216 MG/DL
NONHDLC SERPL-MCNC: 250 MG/DL
TRIGL SERPL-MCNC: 169 MG/DL

## 2023-12-15 PROCEDURE — 80048 BASIC METABOLIC PNL TOTAL CA: CPT

## 2023-12-15 PROCEDURE — 36415 COLL VENOUS BLD VENIPUNCTURE: CPT

## 2023-12-15 PROCEDURE — 80061 LIPID PANEL: CPT

## 2023-12-15 RX ORDER — ATORVASTATIN CALCIUM 40 MG/1
40 TABLET, FILM COATED ORAL DAILY
Qty: 90 TABLET | Refills: 3 | Status: SHIPPED | OUTPATIENT
Start: 2023-12-15

## 2023-12-15 NOTE — RESULT ENCOUNTER NOTE
"Carolina Govea,     It is a pleasure providing you with medical care. I have received and reviewed your lab results, and have the following recommendations:     Your cholesterol continues to remain elevated. What concerns me is your elevated LDL cholesterol. LDL cholesterol can be thought of as \"lousy\" or bad cholesterol and can be a sign of an increased risk for a future heart attack. At this time, I think we can increase your current dose of Lipitor/Atorvastatin from 20mg to 40mg.     Other things you should do to protect your heart health includes: Try to limit your dietary intake of fatty, greasy, oily, fried, take out, and fast food when possible. Also, I would suggest you cut back on red and processed meats, sodium and sugar-sweetened foods and beverages. Regarding exercise, please get at least 30 minutes of CONTINUOUS moderate intensity aerobic exercise at least 3 times per week.      Sincerely,     Fang Otto MD      "

## 2023-12-18 DIAGNOSIS — Z00.00 ENCOUNTER FOR ROUTINE ADULT HEALTH EXAMINATION WITHOUT ABNORMAL FINDINGS: Primary | ICD-10-CM

## 2023-12-18 LAB
ANION GAP SERPL CALCULATED.3IONS-SCNC: 10 MMOL/L (ref 7–15)
BUN SERPL-MCNC: 10.8 MG/DL (ref 8–23)
CALCIUM SERPL-MCNC: 10.2 MG/DL (ref 8.8–10.2)
CHLORIDE SERPL-SCNC: 103 MMOL/L (ref 98–107)
CREAT SERPL-MCNC: 0.68 MG/DL (ref 0.51–0.95)
DEPRECATED HCO3 PLAS-SCNC: 27 MMOL/L (ref 22–29)
EGFRCR SERPLBLD CKD-EPI 2021: >90 ML/MIN/1.73M2
GLUCOSE SERPL-MCNC: 91 MG/DL (ref 70–99)
POTASSIUM SERPL-SCNC: 4.8 MMOL/L (ref 3.4–5.3)
SODIUM SERPL-SCNC: 140 MMOL/L (ref 135–145)

## 2023-12-18 NOTE — RESULT ENCOUNTER NOTE
Carolina Govea,     It is a pleasure providing you with medical care. I have received and reviewed your BMP results, and have the following recommendations:     Your electrolytes are within normal limits, you are not suspected to have chronic kidney disease as your EGFR and Creatinine levels are within normal limits. Please restart your statin medication.       Sincerely,     Fang Otto MD

## 2024-05-08 ENCOUNTER — OFFICE VISIT (OUTPATIENT)
Dept: PODIATRY | Facility: CLINIC | Age: 69
End: 2024-05-08
Payer: COMMERCIAL

## 2024-05-08 VITALS
HEIGHT: 64 IN | DIASTOLIC BLOOD PRESSURE: 74 MMHG | SYSTOLIC BLOOD PRESSURE: 112 MMHG | WEIGHT: 123 LBS | HEART RATE: 64 BPM | BODY MASS INDEX: 21 KG/M2

## 2024-05-08 DIAGNOSIS — G57.62 MORTON'S NEUROMA OF THIRD INTERSPACE OF LEFT FOOT: Primary | ICD-10-CM

## 2024-05-08 PROCEDURE — 99203 OFFICE O/P NEW LOW 30 MIN: CPT | Performed by: PODIATRIST

## 2024-05-08 ASSESSMENT — PAIN SCALES - GENERAL: PAINLEVEL: EXTREME PAIN (9)

## 2024-05-08 NOTE — NURSING NOTE
"Chief Complaint   Patient presents with    Consult     Left foot- occasional numbness- ball of the foot and the fourth digit       Initial /74   Pulse 64   Ht 1.62 m (5' 3.78\")   Wt 55.8 kg (123 lb)   LMP  (LMP Unknown)   BMI 21.26 kg/m   Estimated body mass index is 21.26 kg/m  as calculated from the following:    Height as of this encounter: 1.62 m (5' 3.78\").    Weight as of this encounter: 55.8 kg (123 lb).  Medications and allergies reviewed.      Elizabeth SOSA MA    "

## 2024-05-08 NOTE — LETTER
"    5/8/2024         RE: Mana Govea  [address] [facility] [id number]        Dear Colleague,    Thank you for referring your patient, Mana Govea, to the [facility] [place]. Please see a copy of my visit note below.    PATIENT HISTORY:  Mana Govea is a 68 year old female who presents to clinic in consultation at the request of  Referred Self  with a chief complaint of left foot numbness.  The patient is seen by themselves.  The patient relates the pain is primarily located around the fourth digit.  Patient describes injury as something had fallen on toe last November, 2023  The patient relates that the symptoms have been going on for several month(s).  The patient has previously tried different shoes and rest with little relief.  The patient is retired.  Any previous notes and studies that pertain to the patient's condition were reviewed.    Pertinent medical, surgical and family history was reviewed in the Epic chart.    Past Medical History:   Past Medical History:   Diagnosis Date     Other and unspecified hyperlipidemia        Medications:   Current Outpatient Medications:      atorvastatin (LIPITOR) 40 MG tablet, Take 1 tablet (40 mg) by mouth daily TAKE 1 TABLET ONCE DAILY, Disp: 90 tablet, Rfl: 3     Allergies:  No Known Allergies    Vitals: /74   Pulse 64   Ht 1.62 m (5' 3.78\")   Wt 55.8 kg (123 lb)   LMP  (LMP Unknown)   BMI 21.26 kg/m    BMI= Body mass index is 21.26 kg/m .    LOWER EXTREMITY PHYSICAL EXAM    Dermatologic: Skin is intact to left lower extremity without significant lesions, rash or abrasion.        Vascular: DP & PT pulses are intact & regular on the left.   CFT and skin temperature is normal to the left lower extremity.     Neurologic: Lower extremity sensation is intact to light touch.  No evidence of weakness in the left lower extremity.   Noted positive Tinel's sign over the third intermetatarsal space on the left " foot.     Musculoskeletal: Patient is ambulatory without assistive device or brace.  No gross ankle deformity noted.  No foot or ankle joint effusion is noted.             ASSESSMENT / PLAN:     ICD-10-CM    1. 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  We discussed the underlying contributing factors to the condition as well as both conservative and surgical treatment options along with expected length of recovery.  At this time, the patient was referred to [facility] for custom orthotics that will aid in offloading the tension forces to the soft tissues and prevent further inflammation.      Mana verbalized agreement with and understanding of the rational for the diagnosis and treatment plan.  All questions were answered to best of my ability and the patient's satisfaction. The patient was advised to contact the clinic with any questions that may arise after the clinic visit.      Disclaimer: This note consists of symbols derived from keyboarding, dictation and/or voice recognition software. As a result, there may be errors in the script that have gone undetected. Please consider this when interpreting information found in this chart.       IAN Espinosa.P.WAN., F.A.C.F.A.S.      Again, thank you for allowing me to participate in the care of your patient.        Sincerely,        Mark Rosario DPM

## 2024-05-08 NOTE — PATIENT INSTRUCTIONS

## 2024-05-08 NOTE — PROGRESS NOTES
"PATIENT HISTORY:  Mana Govea is a 68 year old female who presents to clinic in consultation at the request of  Referred Self  with a chief complaint of left foot numbness.  The patient is seen by themselves.  The patient relates the pain is primarily located around the fourth digit.  Patient describes injury as something had fallen on toe last November, 2023  The patient relates that the symptoms have been going on for several month(s).  The patient has previously tried different shoes and rest with little relief.  The patient is retired.  Any previous notes and studies that pertain to the patient's condition were reviewed.    Pertinent medical, surgical and family history was reviewed in the [facility] chart.    Past Medical History:   Past Medical History:   Diagnosis Date    Other and unspecified hyperlipidemia        Medications:   Current Outpatient Medications:     atorvastatin (LIPITOR) 40 MG tablet, Take 1 tablet (40 mg) by mouth daily TAKE 1 TABLET ONCE DAILY, Disp: 90 tablet, Rfl: 3     Allergies:  No Known Allergies    Vitals: /74   Pulse 64   Ht 1.62 m (5' 3.78\")   Wt 55.8 kg (123 lb)   LMP  (LMP Unknown)   BMI 21.26 kg/m    BMI= Body mass index is 21.26 kg/m .    LOWER EXTREMITY PHYSICAL EXAM    Dermatologic: Skin is intact to left lower extremity without significant lesions, rash or abrasion.        Vascular: DP & PT pulses are intact & regular on the left.   CFT and skin temperature is normal to the left lower extremity.     Neurologic: Lower extremity sensation is intact to light touch.  No evidence of weakness in the left lower extremity.   Noted positive Tinel's sign over the third intermetatarsal space on the left foot.     Musculoskeletal: Patient is ambulatory without assistive device or brace.  No gross ankle deformity noted.  No foot or ankle joint effusion is noted.             ASSESSMENT / PLAN:     ICD-10-CM    1. 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  We discussed the underlying contributing factors to the condition as well as both conservative and surgical treatment options along with expected length of recovery.  At this time, the patient was referred to [facility] for custom orthotics that will aid in offloading the tension forces to the soft tissues and prevent further inflammation.      Mana verbalized agreement with and understanding of the rational for the diagnosis and treatment plan.  All questions were answered to best of my ability and the patient's satisfaction. The patient was advised to contact the clinic with any questions that may arise after the clinic visit.      Disclaimer: This note consists of symbols derived from keyboarding, dictation and/or voice recognition software. As a result, there may be errors in the script that have gone undetected. Please consider this when interpreting information found in this chart.       MIRIAM Rosario D.P.M., FMAYCO.F.A.S.

## 2025-06-17 ENCOUNTER — TELEPHONE (OUTPATIENT)
Dept: FAMILY MEDICINE | Facility: CLINIC | Age: 70
End: 2025-06-17
Payer: COMMERCIAL

## 2025-06-17 NOTE — TELEPHONE ENCOUNTER
Patient Quality Outreach    Patient is due for the following:   Physical Annual Wellness Visit    Action(s) Taken:   No follow up needed at this time.  Patient has upcoming appointment, these items will be addressed at that time.    Type of outreach:    Chart review performed, no outreach needed.    Questions for provider review:    None         Raya MADRID LPN  Chart routed to None.

## 2025-06-24 ENCOUNTER — OFFICE VISIT (OUTPATIENT)
Dept: FAMILY MEDICINE | Facility: CLINIC | Age: 70
End: 2025-06-24
Payer: COMMERCIAL

## 2025-06-24 VITALS
TEMPERATURE: 98.1 F | WEIGHT: 130 LBS | SYSTOLIC BLOOD PRESSURE: 120 MMHG | HEIGHT: 64 IN | OXYGEN SATURATION: 98 % | HEART RATE: 69 BPM | RESPIRATION RATE: 14 BRPM | DIASTOLIC BLOOD PRESSURE: 76 MMHG | BODY MASS INDEX: 22.2 KG/M2

## 2025-06-24 DIAGNOSIS — M81.0 AGE-RELATED OSTEOPOROSIS WITHOUT CURRENT PATHOLOGICAL FRACTURE: ICD-10-CM

## 2025-06-24 DIAGNOSIS — E78.5 HYPERLIPIDEMIA LDL GOAL <130: ICD-10-CM

## 2025-06-24 DIAGNOSIS — Z12.31 VISIT FOR SCREENING MAMMOGRAM: ICD-10-CM

## 2025-06-24 DIAGNOSIS — Z00.00 ENCOUNTER FOR MEDICARE ANNUAL WELLNESS EXAM: Primary | ICD-10-CM

## 2025-06-24 LAB
ANION GAP SERPL CALCULATED.3IONS-SCNC: 12 MMOL/L (ref 7–15)
BUN SERPL-MCNC: 11.6 MG/DL (ref 8–23)
CALCIUM SERPL-MCNC: 9.9 MG/DL (ref 8.8–10.4)
CHLORIDE SERPL-SCNC: 103 MMOL/L (ref 98–107)
CHOLEST SERPL-MCNC: 281 MG/DL
CREAT SERPL-MCNC: 0.68 MG/DL (ref 0.51–0.95)
EGFRCR SERPLBLD CKD-EPI 2021: >90 ML/MIN/1.73M2
ERYTHROCYTE [DISTWIDTH] IN BLOOD BY AUTOMATED COUNT: 13.3 % (ref 10–15)
FASTING STATUS PATIENT QL REPORTED: YES
FASTING STATUS PATIENT QL REPORTED: YES
GLUCOSE SERPL-MCNC: 89 MG/DL (ref 70–99)
HCO3 SERPL-SCNC: 23 MMOL/L (ref 22–29)
HCT VFR BLD AUTO: 39.7 % (ref 35–47)
HDLC SERPL-MCNC: 58 MG/DL
HGB BLD-MCNC: 12.6 G/DL (ref 11.7–15.7)
LDLC SERPL CALC-MCNC: 189 MG/DL
MCH RBC QN AUTO: 28.6 PG (ref 26.5–33)
MCHC RBC AUTO-ENTMCNC: 31.7 G/DL (ref 31.5–36.5)
MCV RBC AUTO: 90 FL (ref 78–100)
NONHDLC SERPL-MCNC: 223 MG/DL
PLATELET # BLD AUTO: 276 10E3/UL (ref 150–450)
POTASSIUM SERPL-SCNC: 4.2 MMOL/L (ref 3.4–5.3)
RBC # BLD AUTO: 4.41 10E6/UL (ref 3.8–5.2)
SODIUM SERPL-SCNC: 138 MMOL/L (ref 135–145)
TRIGL SERPL-MCNC: 170 MG/DL
WBC # BLD AUTO: 3.5 10E3/UL (ref 4–11)

## 2025-06-24 PROCEDURE — 3078F DIAST BP <80 MM HG: CPT | Performed by: FAMILY MEDICINE

## 2025-06-24 PROCEDURE — 3074F SYST BP LT 130 MM HG: CPT | Performed by: FAMILY MEDICINE

## 2025-06-24 PROCEDURE — 80048 BASIC METABOLIC PNL TOTAL CA: CPT | Performed by: FAMILY MEDICINE

## 2025-06-24 PROCEDURE — 36415 COLL VENOUS BLD VENIPUNCTURE: CPT | Performed by: FAMILY MEDICINE

## 2025-06-24 PROCEDURE — 85027 COMPLETE CBC AUTOMATED: CPT | Performed by: FAMILY MEDICINE

## 2025-06-24 PROCEDURE — 1125F AMNT PAIN NOTED PAIN PRSNT: CPT | Performed by: FAMILY MEDICINE

## 2025-06-24 PROCEDURE — 80061 LIPID PANEL: CPT | Performed by: FAMILY MEDICINE

## 2025-06-24 PROCEDURE — G0439 PPPS, SUBSEQ VISIT: HCPCS | Performed by: FAMILY MEDICINE

## 2025-06-24 SDOH — HEALTH STABILITY: PHYSICAL HEALTH: ON AVERAGE, HOW MANY DAYS PER WEEK DO YOU ENGAGE IN MODERATE TO STRENUOUS EXERCISE (LIKE A BRISK WALK)?: 2 DAYS

## 2025-06-24 ASSESSMENT — SOCIAL DETERMINANTS OF HEALTH (SDOH): HOW OFTEN DO YOU GET TOGETHER WITH FRIENDS OR RELATIVES?: THREE TIMES A WEEK

## 2025-06-24 ASSESSMENT — PAIN SCALES - GENERAL: PAINLEVEL_OUTOF10: MILD PAIN (1)

## 2025-06-24 NOTE — PROGRESS NOTES
"Preventive Care Visit  [facility]  Mini Guajardo MD, Family Medicine  Jun 24, 2025      Assessment & Plan     Encounter for Medicare annual wellness exam     - CBC with platelets; Future  - CBC with platelets    Hyperlipidemia LDL goal <130   Risks, benefits and alternatives discussed   Discussed CT calcium scan for further assessment.  Total cholesterol >300 and LDL >200, statin strongly recommended  Patient has \"heard many bad things\" about statins and would rather not be on medication.    - Lipid panel reflex to direct LDL Fasting; Future  - Basic metabolic panel  (Ca, Cl, CO2, Creat, Gluc, K, Na, BUN); Future  - CT Coronary Calcium Scan; Future  - Lipid panel reflex to direct LDL Fasting  - Basic metabolic panel  (Ca, Cl, CO2, Creat, Gluc, K, Na, BUN)    Visit for screening mammogram  Patient declines   See discussion, strongly recommend mammogram   - MA Screening Bilateral w/ Cruz; Future    Age-related osteoporosis without current pathological fracture  H/o osteoporosis.  Took alendronate for a short time until they asked her about it when she was donating plasma and then she got suspicious about why it couldn't be in donated plasma so she stopped.   Agreed that if bone density has worsened, she would re-consider restarting alendronate.   - DX Bone Density; Future    Patient has been advised of split billing requirements and indicates understanding: Yes      Reviewed preventive health counseling, as reflected in patient instructions       Healthy diet/nutrition       Osteoporosis prevention/bone health  Counseling  Appropriate preventive services were addressed with this patient via screening, questionnaire, or discussion as appropriate for fall prevention, nutrition, physical activity, Tobacco-use cessation, social engagement, weight loss and cognition.  Checklist reviewing preventive services available has been given to the patient.  Reviewed patient's diet, addressing concerns " and/or questions.   She is at risk for lack of exercise and has been provided with information to increase physical activity for the benefit of her well-being.   The patient was instructed to see the dentist every 6 months.             Vanda Adair is a 69 year old, presenting for the following:  Medicare Visit        6/24/2025    10:11 AM   Additional Questions   Roomed by Radha larry CMA   Accompanied by Self        Via the Health Maintenance questionnaire, the patient has reported the following services have been completed -Mammogram: [facility] 2023-06-10, this information has not been sent to the abstraction team.    HPI       Advance Care Planning    Discussed advance care planning with patient; informed AVS has link to [employer].        6/24/2025   General Health   How would you rate your overall physical health? Excellent   Feel stress (tense, anxious, or unable to sleep) Not at all         6/24/2025   Nutrition   Diet: I don't know         6/24/2025   Exercise   Days per week of moderate/strenous exercise 2 days   (!) EXERCISE CONCERN      6/24/2025   Social Factors   Frequency of gathering with friends or relatives Three times a week   Worry food won't last until get money to buy more No   Food not last or not have enough money for food? No   Do you have housing? (Housing is defined as stable permanent housing and does not include staying outside in a car, in a tent, in an abandoned building, in an overnight shelter, or couch-surfing.) Yes   Are you worried about losing your housing? No   Lack of transportation? No   Unable to get utilities (heat,electricity)? No         6/24/2025   Fall Risk   Fallen 2 or more times in the past year? No   Trouble with walking or balance? No          6/24/2025   Activities of Daily Living- Home Safety   Needs help with the following daily activites None of the above   Safety concerns in the home None of the above         6/24/2025   Dental   Dentist two  times every year? (!) NO         6/24/2025   Hearing Screening   Hearing concerns? None of the above         6/24/2025   Driving Risk Screening   Patient/family members have concerns about driving No         6/24/2025   General Alertness/Fatigue Screening   Have you been more tired than usual lately? No         6/24/2025   Urinary Incontinence Screening   Bothered by leaking urine in past 6 months No         Today's PHQ-2 Score:       6/24/2025    10:03 AM   PHQ-2 ( 1999 [employer])   Q1: Little interest or pleasure in doing things 0   Q2: Feeling down, depressed or hopeless 0   PHQ-2 Score 0    Q1: Little interest or pleasure in doing things Not at all   Q2: Feeling down, depressed or hopeless Not at all   PHQ-2 Score 0       Patient-reported           6/24/2025   Substance Use   Alcohol more than 3/day or more than 7/wk No   Do you have a current opioid prescription? No   How severe/bad is pain from 1 to 10? 1/10   Do you use any other substances recreationally? No     Social History     Tobacco Use    Smoking status: Never    Smokeless tobacco: Never   Substance Use Topics    Alcohol use: No    Drug use: No           12/26/2022   LAST FHS-7 RESULTS   1st degree relative breast or ovarian cancer Yes   Any relative bilateral breast cancer No   Any male have breast cancer No   Any ONE woman have BOTH breast AND ovarian cancer No   Any woman with breast cancer before 50yrs No   2 or more relatives with breast AND/OR ovarian cancer No   2 or more relatives with breast AND/OR bowel cancer No        Mammogram Screening - Mammogram every 1-2 years updated in Health Maintenance based on mutual decision making  Patient doesn't like mammograms, she would rather have clinical breast exam  Discussed that mammogram is gold standard for early detection and that 1:8 women get breast cancer    History of abnormal Pap smear: no        8/24/2005    12:00 AM   PAP / HPV   PAP (Historical) NIL      ASCVD Risk   The 10-year ASCVD  risk score (Jasmeet BONNER, et al., 2019) is: 9.4%    Values used to calculate the score:      Age: 69 years      Sex: Female      Is Non- : No      Diabetic: No      Tobacco smoker: No      Systolic Blood Pressure: 120 mmHg      Is BP treated: No      HDL Cholesterol: 63 mg/dL      Total Cholesterol: 313 mg/dL        Fracture Risk Assessment Tool  Link to Frax Calculator  Use the information below to complete the Frax calculator  : 1955  Sex: female  Weight (kg): 59 kg (actual weight)  Height (cm): 162 cm  Previous Fragility Fracture:  No  History of parent with fractured hip:  No  Current Smoking:  No  Patient has been on glucocorticoids for more than 3 months (5mg/day or more): No  Rheumatoid Arthritis on Problem List:  No  Secondary Osteoporosis on Problem List:  No  Consumes 3 or more units of alcohol per day: No  Femoral Neck BMD (g/cm2)            Reviewed and updated as needed this visit by Provider                      Current providers sharing in care for this patient include:  Patient Care Team:  No Ref-Primary, Physician as PCP - General  Fang Otto MD as Assigned PCP  Mark Rosario DPM as Assigned Surgical Provider    The following health maintenance items are reviewed in Epic and correct as of today:  Health Maintenance   Topic Date Due    DTAP/TDAP/TD VACCINE (1 - Tdap) 2003    PNEUMOCOCCAL VACCINE 50+ YEARS (1 of 1 - PCV) Never done    ZOSTER VACCINE (1 of 2) Never done    COVID-19 VACCINE (1 -  season) Never done    ANNUAL REVIEW OF HM ORDERS  2024    LIPID  12/15/2024    MAMMO SCREENING  2024    INFLUENZA VACCINE (Season Ended) 2025    MEDICARE ANNUAL WELLNESS VISIT  2026    FALL RISK ASSESSMENT  2026    DIABETES SCREENING  12/15/2026    COLORECTAL CANCER SCREENING  2028    ADVANCE CARE PLANNING  2028    RSV VACCINE (1 - 1-dose 75+ series) 2029    DEXA  07/15/2036    HEPATITIS C SCREENING  Completed  "   PHQ-2 (once per calendar year)  Completed    HPV VACCINE  Aged Out    MENINGITIS VACCINE  Aged Out         Review of Systems  Constitutional, HEENT, cardiovascular, pulmonary, gi and gu systems are negative, except as otherwise noted.     Objective    Exam  /76   Pulse 69   Temp 98.1  F (36.7  C) (Tympanic)   Resp 14   Ht 1.62 m (5' 3.78\")   Wt 59 kg (130 lb)   LMP  (LMP Unknown)   SpO2 98%   BMI 22.47 kg/m     Estimated body mass index is 22.47 kg/m  as calculated from the following:    Height as of this encounter: 1.62 m (5' 3.78\").    Weight as of this encounter: 59 kg (130 lb).    Physical Exam  GENERAL: alert and no distress  NECK: no adenopathy, no asymmetry, masses, or scars  RESP: lungs clear to auscultation - no rales, rhonchi or wheezes  BREAST: normal without masses, tenderness or nipple discharge and no palpable axillary masses or adenopathy  CV: regular rate and rhythm, normal S1 S2, no S3 or S4, no murmur, click or rub, no peripheral edema  ABDOMEN: soft, nontender, no hepatosplenomegaly, no masses and bowel sounds normal  MS: no gross musculoskeletal defects noted, no edema  SKIN: no suspicious lesions or rashes  NEURO: Normal strength and tone, mentation intact and speech normal  PSYCH: mentation appears normal, affect normal/bright         6/24/2025   Mini Cog   Clock Draw Score 2 Normal   3 Item Recall 3 objects recalled   Mini Cog Total Score 5              Signed Electronically by: Mini uGajardo MD    "

## 2025-06-24 NOTE — PATIENT INSTRUCTIONS
Health Maintenance reviewed and plan for update discussed.  Patient asked to schedule her mammogram  Call to schedule imaging in [phone]      If you're interested in further evaluation of your cardiac risk, I would recommend a CT Coronary Artery Calcium Scan which is a CT (computed tomography) scan that looks at how much calcium/calcified plaque is in your coronary arteries. The out of pocket cost for this is $114 if you pre-pay.  Let me know if this is something you're interested in doing.       DEXA scan is recommended          Patient Education   Preventive Care Advice   This is general advice given by our system to help you stay healthy. However, your care team may have specific advice just for you. Please talk to your care team about your preventive care needs.  Nutrition  Eat 5 or more servings of fruits and vegetables each day.  Try wheat bread, brown rice and whole grain pasta (instead of white bread, rice, and pasta).  Get enough calcium and vitamin D. Check the label on foods and aim for 100% of the RDA (recommended daily allowance).  Lifestyle  Exercise at least 150 minutes each week  (30 minutes a day, 5 days a week).  Do muscle strengthening activities 2 days a week. These help control your weight and prevent disease.  No smoking.  Wear sunscreen to prevent skin cancer.  Have a dental exam and cleaning every 6 months.  Yearly exams  See your health care team every year to talk about:  Any changes in your health.  Any medicines your care team has prescribed.  Preventive care, family planning, and ways to prevent chronic diseases.  Shots (vaccines)   HPV shots (up to age 26), if you've never had them before.  Hepatitis B shots (up to age 59), if you've never had them before.  COVID-19 shot: Get this shot when it's due.  Flu shot: Get a flu shot every year.  Tetanus shot: Get a tetanus shot every 10 years.  Pneumococcal, hepatitis A, and RSV shots: Ask your care team if you need these  based on your risk.  Shingles shot (for age 50 and up)  General health tests  Diabetes screening:  Starting at age 35, Get screened for diabetes at least every 3 years.  If you are younger than age 35, ask your care team if you should be screened for diabetes.  Cholesterol test: At age 39, start having a cholesterol test every 5 years, or more often if advised.  Bone density scan (DEXA): At age 50, ask your care team if you should have this scan for osteoporosis (brittle bones).  Hepatitis C: Get tested at least once in your life.  STIs (sexually transmitted infections)  Before age 24: Ask your care team if you should be screened for STIs.  After age 24: Get screened for STIs if you're at risk. You are at risk for STIs (including HIV) if:  You are sexually active with more than one person.  You don't use condoms every time.  You or a partner was diagnosed with a sexually transmitted infection.  If you are at risk for HIV, ask about PrEP medicine to prevent HIV.  Get tested for HIV at least once in your life, whether you are at risk for HIV or not.  Cancer screening tests  Cervical cancer screening: If you have a cervix, begin getting regular cervical cancer screening tests starting at age 21.  Breast cancer scan (mammogram): If you've ever had breasts, begin having regular mammograms starting at age 40. This is a scan to check for breast cancer.  Colon cancer screening: It is important to start screening for colon cancer at age 45.  Have a colonoscopy test every 10 years (or more often if you're at risk) Or, ask your provider about stool tests like a FIT test every year or Cologuard test every 3 years.  To learn more about your testing options, visit:   .  For help making a decision, visit:   https://bit.ly/qj07785.  Prostate cancer screening test: If you have a prostate, ask your care team if a prostate cancer screening test (PSA) at age 55 is right for you.  Lung cancer screening: If you are a current or former  smoker ages 50 to 80, ask your care team if ongoing lung cancer screenings are right for you.  For informational purposes only. Not to replace the advice of your health care provider. Copyright   2023 [facility]. All rights reserved. Clinically reviewed by the [facility]. [employer] 263304 - REV 01/24.

## 2025-06-25 ENCOUNTER — RESULTS FOLLOW-UP (OUTPATIENT)
Dept: FAMILY MEDICINE | Facility: CLINIC | Age: 70
End: 2025-06-25

## (undated) RX ORDER — PROPOFOL 10 MG/ML
INJECTION, EMULSION INTRAVENOUS
Status: DISPENSED
Start: 2023-06-23